# Patient Record
Sex: FEMALE | Race: WHITE | Employment: FULL TIME | URBAN - NONMETROPOLITAN AREA
[De-identification: names, ages, dates, MRNs, and addresses within clinical notes are randomized per-mention and may not be internally consistent; named-entity substitution may affect disease eponyms.]

---

## 2018-03-15 ENCOUNTER — OFFICE VISIT (OUTPATIENT)
Dept: PRIMARY CARE CLINIC | Age: 48
End: 2018-03-15
Payer: COMMERCIAL

## 2018-03-15 VITALS
HEART RATE: 84 BPM | DIASTOLIC BLOOD PRESSURE: 76 MMHG | HEIGHT: 61 IN | BODY MASS INDEX: 35.87 KG/M2 | TEMPERATURE: 98 F | WEIGHT: 190 LBS | SYSTOLIC BLOOD PRESSURE: 116 MMHG | RESPIRATION RATE: 16 BRPM

## 2018-03-15 DIAGNOSIS — T36.95XA ANTIBIOTIC-INDUCED YEAST INFECTION: Primary | ICD-10-CM

## 2018-03-15 DIAGNOSIS — B37.9 ANTIBIOTIC-INDUCED YEAST INFECTION: Primary | ICD-10-CM

## 2018-03-15 PROCEDURE — 1036F TOBACCO NON-USER: CPT | Performed by: NURSE PRACTITIONER

## 2018-03-15 PROCEDURE — G8427 DOCREV CUR MEDS BY ELIG CLIN: HCPCS | Performed by: NURSE PRACTITIONER

## 2018-03-15 PROCEDURE — G8484 FLU IMMUNIZE NO ADMIN: HCPCS | Performed by: NURSE PRACTITIONER

## 2018-03-15 PROCEDURE — 99202 OFFICE O/P NEW SF 15 MIN: CPT | Performed by: NURSE PRACTITIONER

## 2018-03-15 PROCEDURE — G8417 CALC BMI ABV UP PARAM F/U: HCPCS | Performed by: NURSE PRACTITIONER

## 2018-03-15 RX ORDER — BUPRENORPHINE HYDROCHLORIDE AND NALOXONE HYDROCHLORIDE DIHYDRATE 8; 2 MG/1; MG/1
TABLET SUBLINGUAL
Refills: 0 | COMMUNITY
Start: 2018-03-07

## 2018-03-15 RX ORDER — FLUCONAZOLE 150 MG/1
TABLET ORAL
Qty: 2 TABLET | Refills: 0 | Status: SHIPPED | OUTPATIENT
Start: 2018-03-15

## 2018-03-15 RX ORDER — TOPIRAMATE 50 MG/1
TABLET, FILM COATED ORAL
Refills: 0 | COMMUNITY
Start: 2018-03-07

## 2018-03-15 ASSESSMENT — ENCOUNTER SYMPTOMS
COUGH: 0
SORE THROAT: 0
VOMITING: 0
NAUSEA: 0

## 2018-03-15 NOTE — PATIENT INSTRUCTIONS
control pills, hormones, and steroids. You may also be more likely to get a yeast infection if you are pregnant, have diabetes, douche, or wear tight clothes. With treatment, most yeast infections get better in 2 to 3 days. Follow-up care is a key part of your treatment and safety. Be sure to make and go to all appointments, and call your doctor if you are having problems. It's also a good idea to know your test results and keep a list of the medicines you take. How can you care for yourself at home? · Take your medicines exactly as prescribed. Call your doctor if you think you are having a problem with your medicine. · Ask your doctor about over-the-counter (OTC) medicines for yeast infections. They may cost less than prescription medicines. If you use an OTC treatment, read and follow all instructions on the label. · Do not use tampons while using a vaginal cream or suppository. The tampons can absorb the medicine. Use pads instead. · Wear loose cotton clothing. Do not wear nylon or other fabric that holds body heat and moisture close to the skin. · Try sleeping without underwear. · Do not scratch. Relieve itching with a cold pack or a cool bath. · Do not wash your vaginal area more than once a day. Use plain water or a mild, unscented soap. Air-dry the vaginal area. · Change out of wet swimsuits after swimming. · Do not have sex until you have finished your treatment. · Do not douche. When should you call for help? Call your doctor now or seek immediate medical care if:  ? · You have unexpected vaginal bleeding. ? · You have new or increased pain in your vagina or pelvis. ? Watch closely for changes in your health, and be sure to contact your doctor if:  ? · You have a fever. ? · You are not getting better after 2 days. ? · Your symptoms come back after you finish your medicines. Where can you learn more? Go to https://[facility]. org and sign in to your [employer] account. Enter Z965 in the [facility] box to learn more about \"Vaginal Yeast Infection: Care Instructions. \"     If you do not have an account, please click on the \"Sign Up Now\" link. Current as of: October 13, 2016  Content Version: 11.5  © 6752-6689 [employer], Incorporated. Care instructions adapted under license by [facility] ([facility]). If you have questions about a medical condition or this instruction, always ask your healthcare professional. [address] any warranty or liability for your use of this information. · Requests medication for yeast infection, advises that she gets yeast infection every time she takes an antibiotic. · Diflucan as prescribed. · Follow up with PCP or [facility] if symptoms worsen or do not improve.

## 2020-07-20 ENCOUNTER — HOSPITAL ENCOUNTER (OUTPATIENT)
Dept: PHYSICAL THERAPY | Age: 50
Setting detail: THERAPIES SERIES
Discharge: HOME | End: 2020-07-20
Payer: COMMERCIAL

## 2020-07-20 PROCEDURE — 97161 PT EVAL LOW COMPLEX 20 MIN: CPT

## 2020-07-20 NOTE — PLAN OF CARE
[facility] LACY DIXON       Phone: [phone]   Date: 2020                      Outpatient Physical Therapy  Fax: [phone]    ACCT #: [de-identified]                     Plan of Care  [facility]#: [id number]  Patient: Rosario Torrez  : 1970    Referring Practitioner: Dr. Aric Magallanes    Referral Date : 20    Diagnosis: Strain of muscle, fascia and tendon at neck level   Onset Date: 06/10/20  Treatment Diagnosis: Neck pain    Assessment  Body structures, Functions, Activity limitations: Decreased functional mobility , Decreased ADL status, Decreased ROM, Decreased strength, Decreased high-level IADLs, Increased pain, Decreased posture  Assessment: Pt to benefit from ther ex for cervical ROM and postural strengthening. Pt to also benefit from manual therapy for joint and tissue mobility of the cervical spine. Prognosis: Good    Treatment Plan   Days: 2(2-3) times per week Weeks: 5 weeks Total # of Visits Approved: 10    Patient Education/HEP, Back Education, Therapeutic Exercise, Manual Therapy: Myofacial Release/Cupping, Manual Therapy: High Velocity Low Amplitude Thrust(HVLA), Manual Therapy: Mobilization/Manipulation, Traction, HP/CP and Electrical Stimulation     Goals  Short term goals  Time Frame for Short term goals: 5 visits  Short term goal 1: Pt to report independence and compliance with HEP for cervical ROM. Long term goals  Time Frame for Long term goals : 10 visits  Long term goal 1: Pt to score <15/50 on NDI to improve ADLs and work scout. Long term goal 2: Pt to have 60 deg of B/L cervical rotation to improve driving safety. Long term goal 3: Pt to report sleeping x2 consecutive nights without waking due to neck pain. Long term goal 4: Pt to have 4/5 Horiz ABD strength with MMT to improve pt posture and reduce neck pain.      Andreea Hendrickson, PT   Date: 2020    ______________________________________ Date: 2020  Physician Signature  By signing above or cosigning electronically, I have reviewed this Plan of Care and certify a need for medically necessary rehabilitation services.

## 2020-07-20 NOTE — PROGRESS NOTES
Phone: [facility]         Fax: [phone]                      Outpatient Physical Therapy                                                                      Evaluation    Date: 2020  Patient: Macario Payne  : 1970  ACCT #: [de-identified]    Referring Practitioner: Dr. 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 Pt reports no numbness/tingling, just pain. Pain at rest 0/10, with driving and looking R 6/10 which is the worst.  Sleep disrupted due to pain, typically sleeps with 2 pillows. Pt educated on single pillow use. Pt is left sidesleeper. Pt had xrays at [facility], reported decreased lordosis, but no degeneration. Pt takes 1/2 a flexeril at night. 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 Normal lifting max is approx 20#. Generally pain with rotation will decrease within a few min after motion is stopped. Pain Screening  Patient Currently in Pain: Denies(tightness)     Objective  Observation/Palpation  Posture: Fair(rounded shoulder with fwd head)  Palpation: Decreased mobility at C4,5,6; Paraspinal tension/knots same levels. Capitus muscle also with abnormal tone.   Spine  Cervical: Flexion=23deg, Extension=35deg, Rotation: R=25deg, L=55deg; Sidebending: R=25deg, L=15deg  Strength RUE  R Shoulder Flexion: 4+/5  R Shoulder Extension: 4+/5  R Shoulder ABduction: 4+/5  R Shoulder Horizontal ABduction: 3+/5  Strength LUE  L Shoulder Flexion: 4-/5  L Shoulder Extension: 4+/5  L Shoulder ABduction: 4-/5  L Shoulder Horizontal ABduction: 3/5    Assessment  Body structures, Functions, Activity limitations: Decreased functional mobility , Decreased ADL status, Decreased ROM, Decreased strength, Decreased high-level IADLs, Increased pain, Decreased posture  Assessment: Pt to benefit from ther ex for cervical ROM and postural strengthening. Pt to also benefit from manual therapy for joint and tissue mobility of the cervical spine. Prognosis: Good  Decision Making: Low Complexity  Exam: NDI- 18/50    Clinical Presentation:  Stable/Uncomplicated  The Following Comorbities will impact the patients progression and Plan of Care:   Previous Orthopedic Injury/Surgery       Activity Tolerance: Patient Tolerated treatment well    Education: POC; towel roll for pillow to improve night time support     Barriers to Learning: None    Goals  Short term goals  Time Frame for Short term goals: 5 visits  Short term goal 1: Pt to report independence and compliance with HEP for cervical ROM. Long term goals  Time Frame for Long term goals : 10 visits  Long term goal 1: Pt to score <15/50 on NDI to improve ADLs and work scout. Long term goal 2: Pt to have 60 deg of B/L cervical rotation to improve driving safety. Long term goal 3: Pt to report sleeping x2 consecutive nights without waking due to neck pain. Long term goal 4: Pt to have 4/5 Horiz ABD strength with MMT to improve pt posture and reduce neck pain.     Patient's Goal:  Patient goals : Be rid of her neck pain    Timed Code Treatment Minutes: 0 Minutes  Total Treatment Time: 50  Time In: 5515  Time Out: 309 Bradley Stahl PT Date: 7/20/2020

## 2020-07-23 ENCOUNTER — HOSPITAL ENCOUNTER (OUTPATIENT)
Dept: PHYSICAL THERAPY | Age: 50
Setting detail: THERAPIES SERIES
Discharge: HOME | End: 2020-07-23
Payer: COMMERCIAL

## 2020-07-23 PROCEDURE — 97140 MANUAL THERAPY 1/> REGIONS: CPT

## 2020-07-23 PROCEDURE — 97110 THERAPEUTIC EXERCISES: CPT

## 2020-07-23 ASSESSMENT — PAIN SCALES - GENERAL: PAINLEVEL_OUTOF10: 2

## 2020-07-23 NOTE — PROGRESS NOTES
Phone: [address]      Fax: [phone]                            Outpatient Physical Therapy                                                                            Daily Note    Date: 2020  Patient Name: Cheree Aase        MRN: [id number]   ACCT#:  [de-identified]  : 1970  (52 y.o.)    Referring Practitioner: Dr. Beto Dorsey    Referral Date : 20    Diagnosis: Strain of muscle, fascia and tendon at neck level   Treatment Diagnosis: Neck pain    Onset Date: 06/10/20  PT Insurance Information: [address] [place] [address]  Total # of Visits Approved: 10 Per Physician Order  Total # of Visits to Date: 2  No Show: 0  Canceled Appointment: 0    Pre-Treatment Pain:  2/10     Assessment  Assessment: Completed gym exercises per flowsheet with pt demonstrating good tolerance,no c/o increased pain. Issued doorway stretch for HEP. Ended session witn manual therapy having pt report neck feeling better. Plan to continue with POC and progress per pt tolerance. Chart Reviewed: Yes    Plan  Plan: Continue with current plan    Exercises/Modalities/Manual:  See DocFlow Sheet    Education:      Barriers to Learning: None    Goals  (Total # of Visits to Date: 2)   Short Term Goals - Time Frame for Short term goals: 5 visits  Short term goal 1: Pt to report independence and compliance with HEP for cervical ROM. Long Term Goals - Time Frame for Long term goals : 10 visits  Long term goal 1: Pt to score <15/50 on NDI to improve ADLs and work scout. Long term goal 2: Pt to have 60 deg of B/L cervical rotation to improve driving safety. Long term goal 3: Pt to report sleeping x2 consecutive nights without waking due to neck pain. Long term goal 4: Pt to have 4/5 Horiz ABD strength with MMT to improve pt posture and reduce neck pain.        Post Treatment Pain:  2/10        Time In: 0902  Time Out: 5830  Timed Code Treatment Minutes: 45 Minutes  Total Treatment Time: 39 Minutes    Time In/Out Each Unit billed  Therapeutic Exercise: 09:02-09:32  Manual [id number]:47:    Electrical Stim:  Traction:      Richie Lopez PTA     Date: 7/23/2020

## 2020-07-27 ENCOUNTER — HOSPITAL ENCOUNTER (OUTPATIENT)
Dept: PHYSICAL THERAPY | Age: 50
Setting detail: THERAPIES SERIES
Discharge: HOME | End: 2020-07-27
Payer: COMMERCIAL

## 2020-07-27 PROCEDURE — 97110 THERAPEUTIC EXERCISES: CPT

## 2020-07-27 PROCEDURE — 97140 MANUAL THERAPY 1/> REGIONS: CPT

## 2020-07-27 NOTE — PROGRESS NOTES
Phone: [address]      Fax: [phone]                            Outpatient Physical Therapy                                                                            Daily Note    Date: 2020  Patient Name: Irina Walker        MRN: [id number]   ACCT#:  [de-identified]  : 1970  (52 y.o.)    Referring Practitioner: Dr. Maurice Brochure    Referral Date : 20    Diagnosis: Strain of muscle, fascia and tendon at neck level   Treatment Diagnosis: Neck pain    Onset Date: 06/10/20  PT Insurance Information: [address] [place] [address]  Total # of Visits Approved: 10 Per Physician Order  Total # of Visits to Date: 3  No Show: 0  Canceled Appointment: 0    Pre-Treatment Pain:  0/10     Assessment  Pt reports decreased pain after last visit. No pain today. Performed ex as outlined for postural strengthening and flexibility followed by manual therapy for mobility. Chart Reviewed: Yes    Plan  Plan: Continue with current plan    Exercises/Modalities/Manual:  See DocFlow Sheet     Barriers to Learning: None    Goals  (Total # of Visits to Date: 3)   Short Term Goals - Time Frame for Short term goals: 5 visits  Short term goal 1: Pt to report independence and compliance with HEP for cervical ROM. -met        Long Term Goals - Time Frame for Long term goals : 10 visits  Long term goal 1: Pt to score <15/50 on NDI to improve ADLs and work scout. Long term goal 2: Pt to have 60 deg of B/L cervical rotation to improve driving safety. Long term goal 3: Pt to report sleeping x2 consecutive nights without waking due to neck pain. Long term goal 4: Pt to have 4/5 Horiz ABD strength with MMT to improve pt posture and reduce neck pain.        Post Treatment Pain:  0/10    Time In: 1415    Time Out : 1500        Timed Code Treatment Minutes: 45 Minutes  Total Treatment Time: 45 Minutes    Time In/Out Each Unit billed  Therapeutic Exercise:  30 min  Manual Therapy:  15 min        Shari Quinones PTA

## 2020-08-03 ENCOUNTER — HOSPITAL ENCOUNTER (OUTPATIENT)
Dept: PHYSICAL THERAPY | Age: 50
Setting detail: THERAPIES SERIES
Discharge: HOME | End: 2020-08-03
Payer: COMMERCIAL

## 2020-08-03 PROCEDURE — 97110 THERAPEUTIC EXERCISES: CPT

## 2020-08-03 PROCEDURE — 97140 MANUAL THERAPY 1/> REGIONS: CPT

## 2020-08-03 ASSESSMENT — PAIN SCALES - GENERAL: PAINLEVEL_OUTOF10: 2

## 2020-08-03 NOTE — PROGRESS NOTES
Phone: [address]      Fax: [phone]                            Outpatient Physical Therapy                                                                            Daily Note    Date: 8/3/2020  Patient Name: Jessica Mendoza        MRN: [id number]   ACCT#:  [de-identified]  : 1970  (52 y.o.)    Referring Practitioner: Dr. Venkatesh Dunn    Referral Date : 20    Diagnosis: Strain of muscle, fascia and tendon at neck level   Treatment Diagnosis: Neck pain    Onset Date: 06/10/20  PT Insurance Information: [facility] 10 exp 20  Total # of Visits Approved: 10 Per Physician Order  Total # of Visits to Date: 4  No Show: 0  Canceled Appointment: 0  Plan of Care/Certification Expiration Date: 20    Pre-Treatment Pain:  2/10     Assessment  Assessment: Pt reports increased pain post last session and still sore this date. Pt reports that she has more of an ache/soreness vs sharp pain. No progression of ther ex this date but will plan to add wt to prone ther ex next visit. Chart Reviewed: Yes    Plan  Plan: Continue with current plan    Exercises/Modalities/Manual:  See DocFlow Sheet    Education: held progression of therex this date due to increased pain post last session     Barriers to Learning: None    Goals  (Total # of Visits to Date: 4)   Short Term Goals - Time Frame for Short term goals: 5 visits  Short term goal 1: Pt to report independence and compliance with HEP for cervical ROM. -met       Long Term Goals - Time Frame for Long term goals : 10 visits(POC EXP 20)  Long term goal 1: Pt to score <15/50 on NDI to improve ADLs and work scout. Long term goal 2: Pt to have 60 deg of B/L cervical rotation to improve driving safety. Long term goal 3: Pt to report sleeping x2 consecutive nights without waking due to neck pain. Long term goal 4: Pt to have 4/5 Horiz ABD strength with MMT to improve pt posture and reduce neck pain.        Post Treatment Pain: 0/10    Time In: 1502  Time Out: 1551  Timed Code Treatment Minutes: 49 Minutes  Total Treatment Time: 49 Minutes    Time In/Out Each Unit billed  Therapeutic Exercise:  0419-9696  Manual Therapy:  5765-8293  Electrical Stim:  Traction:  Seward Mcburney, PT     Date: 8/3/2020

## 2020-08-10 ENCOUNTER — HOSPITAL ENCOUNTER (OUTPATIENT)
Dept: PHYSICAL THERAPY | Age: 50
Setting detail: THERAPIES SERIES
Discharge: HOME | End: 2020-08-10
Payer: COMMERCIAL

## 2020-08-10 PROCEDURE — 97110 THERAPEUTIC EXERCISES: CPT

## 2020-08-10 PROCEDURE — 97140 MANUAL THERAPY 1/> REGIONS: CPT

## 2020-08-10 NOTE — PROGRESS NOTES
Phone: [address]      Fax: [phone]                            Outpatient Physical Therapy                                                                            Daily Note    Date: 8/10/2020  Patient Name: Cheree Aase        MRN: [id number]   ACCT#:  [de-identified]  : 1970  (52 y.o.)    Referring Practitioner: Dr. Beto Dorsey    Referral Date : 20    Diagnosis: Strain of muscle, fascia and tendon at neck level   Treatment Diagnosis: Neck pain    Onset Date: 06/10/20  PT Insurance Information: [facility] 10v exp 20  Total # of Visits Approved: 10 Per Physician Order  Total # of Visits to Date: 5  No Show: 0  Canceled Appointment: 0  Plan of Care/Certification Expiration Date: 20    Pre-Treatment Pain:  0/10     Assessment  Assessment: Patient late for appointment this date. Patient arrived and left painfree this date. Completed exercises per exercise log for strengthening and manual for improved ROM. Cervical ROM post manual therapy R = 45 degrees L 65 degrees. Plan to continue with current POC. Chart Reviewed: Yes    Plan  Plan: Continue with current plan    Exercises/Modalities/Manual:  See DocFlow Sheet    Education:      Barriers to Learning: None    Goals  (Total # of Visits to Date: 5)   Short Term Goals - Time Frame for Short term goals: 5 visits  Short term goal 1: Pt to report independence and compliance with HEP for cervical ROM. -met                Long Term Goals - Time Frame for Long term goals : 10 visits(POC EXP 20)  Long term goal 1: Pt to score <15/50 on NDI to improve ADLs and work scout. Long term goal 2: Pt to have 60 deg of B/L cervical rotation to improve driving safety. Long term goal 3: Pt to report sleeping x2 consecutive nights without waking due to neck pain. Long term goal 4: Pt to have 4/5 Horiz ABD strength with MMT to improve pt posture and reduce neck pain.        Post Treatment Pain:  0/10        Time In: 1437  Time Out: 1512  Timed Code Treatment Minutes: 35 Minutes  Total Treatment Time: 35 Minutes  [id number]   Manual 1521 - 100 [facility] , PTA     Date: 8/10/2020

## 2020-08-13 ENCOUNTER — HOSPITAL ENCOUNTER (OUTPATIENT)
Dept: PHYSICAL THERAPY | Age: 50
Setting detail: THERAPIES SERIES
Discharge: HOME | End: 2020-08-13
Payer: COMMERCIAL

## 2020-08-13 PROCEDURE — 97140 MANUAL THERAPY 1/> REGIONS: CPT

## 2020-08-13 PROCEDURE — 97110 THERAPEUTIC EXERCISES: CPT

## 2020-08-13 ASSESSMENT — PAIN SCALES - GENERAL: PAINLEVEL_OUTOF10: 1

## 2020-08-13 NOTE — PROGRESS NOTES
Phone: [address] Reyes      Fax: [phone]                            Outpatient Physical Therapy                                                                            Daily Note    Date: 2020  Patient Name: Macario Payne        MRN: [id number]   ACCT#:  [de-identified]  : 1970  (52 y.o.)    Referring Practitioner: Dr. Nevaeh Urias    Referral Date : 20    Diagnosis: Strain of muscle, fascia and tendon at neck level   Treatment Diagnosis: Neck pain    Onset Date: 06/10/20  PT Insurance Information: [facility] 10v exp 20  Total # of Visits Approved: 10 Per Physician Order  Total # of Visits to Date: 6  No Show: 0  Canceled Appointment: 0  Plan of Care/Certification Expiration Date: 20    Pre-Treatment Pain:  1/10     Assessment  Assessment: Patient reports sleeping has improved and occassionally has a night where she is awakend by pain. Progressed Tband exercises this date and rows on ball. Pt continues to have increased pain with over ball horiz ABD, kept reps the same. Plan to continue with curent at this time. Chart Reviewed: Yes    Plan  Plan: Continue with current plan    Exercises/Modalities/Manual:  See DocFlow Sheet    Education:      Barriers to Learning: None    Goals  (Total # of Visits to Date: 6)   Short Term Goals - Time Frame for Short term goals: 5 visits  Short term goal 1: Pt to report independence and compliance with HEP for cervical ROM. -met                Long Term Goals - Time Frame for Long term goals : 10 visits(POC EXP 20)  Long term goal 1: Pt to score <15/50 on NDI to improve ADLs and work scout. Long term goal 2: Pt to have 60 deg of B/L cervical rotation to improve driving safety. Long term goal 3: Pt to report sleeping x2 consecutive nights without waking due to neck pain. Long term goal 4: Pt to have 4/5 Horiz ABD strength with MMT to improve pt posture and reduce neck pain.        Post Treatment Pain:  1/10      Time In: 1433  Time Out: 1511  Timed Code Treatment Minutes: 38 Minutes  Total Treatment Time: 38 Minutes    Time In/Out Each Unit billed  Therapeutic Exercise:  [id number]  Manual Therapy: [id number]   Electrical Stim:  Traction:      Bailey Parker PTA     Date: 8/13/2020

## 2020-08-18 ENCOUNTER — HOSPITAL ENCOUNTER (OUTPATIENT)
Dept: PHYSICAL THERAPY | Age: 50
Setting detail: THERAPIES SERIES
Discharge: HOME | End: 2020-08-18
Payer: COMMERCIAL

## 2020-08-18 PROCEDURE — 97110 THERAPEUTIC EXERCISES: CPT

## 2020-08-18 PROCEDURE — 97012 MECHANICAL TRACTION THERAPY: CPT

## 2020-08-18 ASSESSMENT — PAIN SCALES - GENERAL: PAINLEVEL_OUTOF10: 1

## 2020-08-18 NOTE — PROGRESS NOTES
Phone: [address] Re[address]      Fax: [phone]                            Outpatient Physical Therapy                                                                            Daily Note    Date: 2020  Patient Name: Juli Watson        MRN: [id number]   ACCT#:  [de-identified]  : 1970  (52 y.o.)    Referring Practitioner: Dr. Ziggy Hansen    Referral Date : 20    Diagnosis: Strain of muscle, fascia and tendon at neck level   Treatment Diagnosis: Neck pain    Onset Date: 06/10/20  PT Insurance Information: [facility] 10v exp 20  Total # of Visits Approved: 10 Per Physician Order  Total # of Visits to Date: 7  Plan of Care/Certification Expiration Date: 20    Pre-Treatment Pain:  1/10     Assessment  Assessment: Patient arrived stating having difficulties with shoulder flexion. Patient reports sympoms across clavical and down arm have returned and increased when pulling truck door shut. PT consulted and it was decided to discontinue with manual and attempt traction to decrease symptoms. Patient left without any increased pain. Chart Reviewed: Yes    Plan  Plan: Continue with current plan    Exercises/Modalities/Manual:  See DocFlow Sheet    Education:      Barriers to Learning: None    Goals  (Total # of Visits to Date: 7)   Short Term Goals - Time Frame for Short term goals: 5 visits  Short term goal 1: Pt to report independence and compliance with HEP for cervical ROM. -met                Long Term Goals - Time Frame for Long term goals : 10 visits(POC EXP 20)  Long term goal 1: Pt to score <15/50 on NDI to improve ADLs and work scout. Long term goal 2: Pt to have 60 deg of B/L cervical rotation to improve driving safety. Long term goal 3: Pt to report sleeping x2 consecutive nights without waking due to neck pain. Long term goal 4: Pt to have 4/5 Horiz ABD strength with MMT to improve pt posture and reduce neck pain.        Post Treatment Pain: 1/10        Time In: 1454  Time Out: 1530  Timed Code Treatment Minutes: 26 Minutes  Total Treatment Time: 38 Minutes    Time In/Out Each Unit billed  Therapeutic Exercise:  1454 - 1525  Manual Therapy:    Electrical Stim:  Traction:1520 - 0      Storm Pereira, PTA     Date: 8/18/2020

## 2020-08-20 ENCOUNTER — HOSPITAL ENCOUNTER (OUTPATIENT)
Dept: PHYSICAL THERAPY | Age: 50
Setting detail: THERAPIES SERIES
Discharge: HOME | End: 2020-08-20
Payer: COMMERCIAL

## 2020-08-20 PROCEDURE — 97012 MECHANICAL TRACTION THERAPY: CPT

## 2020-08-20 PROCEDURE — 97110 THERAPEUTIC EXERCISES: CPT

## 2020-08-20 ASSESSMENT — PAIN SCALES - GENERAL: PAINLEVEL_OUTOF10: 2

## 2020-08-20 NOTE — PROGRESS NOTES
Phone: [address]            Fax: [phone]                             Outpatient Physical Therapy                                                                      Progress Report    Date: 2020   MRN: [id number]    ACCT#: [de-identified]  Patient: Jean Pierre Mario  : 1970  Referring Practitioner: Dr. Kandra Blizzard    Referral Date : 20    Diagnosis: Strain of muscle, fascia and tendon at neck level       Treatment Diagnosis: Neck pain    Onset Date: 06/10/20  PT Insurance Information: [address] [place] [address] 10 exp 20  Total # of Visits Approved: 10 Per Physician Order  Total # of Visits to Date: 8  No Show: 0  Canceled Appointment: 0    Assessment  Pt reports no change with traction noted. Sleeping is not a problem, pt not waking due to pain. Pt reports yesterday when opening a jar pain increased. Pt reports that with any R UE use requiring force the pain increases. Held wts with Sandi Copas this date due to increased pain with weight. Increased traction pull to 14# this date with good scout. Cervical Rotation: R=43deg, L=70deg. Pt reports pain still reaches 8/10 when it occurs. Pt reports pain starts at C5-6 R side of neck and radiates into SCM distribution. Pt likely to benefit from additional imaging to determine source of pain    Prognosis: Good    Plan  Plan: Continue with current plan    Goals  Short term goals  Time Frame for Short term goals: 5 visits  Short term goal 1: Pt to report independence and compliance with HEP for cervical ROM. -met    Long term goals  Time Frame for Long term goals : 10 visits(POC EXP 20)  Long term goal 1: Pt to score <15/50 on NDI to improve ADLs and work scout. Long term goal 2: Pt to have 60 deg of B/L cervical rotation to improve driving safety. Long term goal 3: Pt to report sleeping x2 consecutive nights without waking due to neck pain. -MET  Long term goal 4: Pt to have 4/5 Horiz ABD strength with MMT to improve pt posture and reduce neck pain.     Tiffanie Parra    Date: 8/20/2020

## 2020-08-20 NOTE — PROGRESS NOTES
Phone: Jayce Reyes      Fax: [phone]                            Outpatient Physical Therapy                                                                            Daily Note    Date: 2020  Patient Name: Eunice Sierra        MRN: [id number]   ACCT#:  [de-identified]  : 1970  (52 y.o.)    Referring Practitioner: Dr. Donta Blood    Referral Date : 20    Diagnosis: Strain of muscle, fascia and tendon at neck level   Treatment Diagnosis: Neck pain    Onset Date: 06/10/20  PT Insurance Information: [id number] 10 exp 20  Total # of Visits Approved: 10 Per Physician Order  Total # of Visits to Date: 8  No Show: 0  Canceled Appointment: 0  Plan of Care/Certification Expiration Date: 20    Pre-Treatment Pain:  2/10     Assessment  Assessment: Pt reports no change with traction noted. Sleeping is not a problem, pt not waking due to pain. Pt reports yesterday when opening a jar pain increased. Pt reports that with any R UE use requiring force the pain increases. Held wts with Román Arik this date due to increased pain with weight. Cervical Rotation: R=43deg, L=70deg. Increased traction pull to 14# this date with good scout. Pt reports pain still reaches 8/10 when it occurs. Pt reports pain starts at C5-6 R side of neck and radiates into SCM distribution. Chart Reviewed: Yes    Plan  Plan: Continue with current plan    Exercises/Modalities/Manual:  See DocFlow Sheet    Education: Discussed calling physician due to symptoms returning with R UE use. Pt likely to benefit from additional imaging     Barriers to Learning: None    Goals  (Total # of Visits to Date: 8)   Short Term Goals - Time Frame for Short term goals: 5 visits  Short term goal 1: Pt to report independence and compliance with HEP for cervical ROM. -met     Long Term Goals - Time Frame for Long term goals : 10 visits(POC EXP 20)  Long term goal 1: Pt to score <15/50 on NDI to improve ADLs and work scout. Long term goal 2: Pt to have 60 deg of B/L cervical rotation to improve driving safety.-Not Met  Long term goal 3: Pt to report sleeping x2 consecutive nights without waking due to neck pain. -MET  Long term goal 4: Pt to have 4/5 Horiz ABD strength with MMT to improve pt posture and reduce neck pain.        Post Treatment Pain:  2/10    Time In: 1301   Time Out: 1339  Timed Code Treatment Minutes: 26 Minutes  Total Treatment Time: JUANITA Still     Date: 8/20/2020

## 2020-08-28 ENCOUNTER — APPOINTMENT (OUTPATIENT)
Dept: PHYSICAL THERAPY | Age: 50
End: 2020-08-28
Payer: COMMERCIAL

## 2021-08-12 ENCOUNTER — HOSPITAL ENCOUNTER (OUTPATIENT)
Dept: PHYSICAL THERAPY | Age: 51
Setting detail: THERAPIES SERIES
Discharge: HOME | End: 2021-08-12
Payer: COMMERCIAL

## 2021-08-12 PROCEDURE — 97110 THERAPEUTIC EXERCISES: CPT

## 2021-08-12 PROCEDURE — 97162 PT EVAL MOD COMPLEX 30 MIN: CPT

## 2021-08-12 ASSESSMENT — PAIN SCALES - GENERAL: PAINLEVEL_OUTOF10: 5

## 2021-08-12 ASSESSMENT — PAIN DESCRIPTION - ORIENTATION: ORIENTATION: RIGHT

## 2021-08-12 ASSESSMENT — PAIN DESCRIPTION - LOCATION: LOCATION: SHOULDER

## 2021-08-12 NOTE — PROGRESS NOTES
Phone: [phone]         Fax: [phone]                      Outpatient Physical Therapy                                                                      Evaluation    Date: 2021  Patient: Genia Morrison  : 1970  ACCT #: [de-identified]    Referring Practitioner: Donnie Cavazos PA-C    Referral Date : 21    Diagnosis: Impingement syndrome right shoulder, OA, Bicep tendonitis R UE    Treatment Diagnosis: R shld pain  Onset Date: 21 (Referral)  Total # of Visits Approved: 18 Per Physician Order  Total # of Visits to Date: 1     Subjective     Additional Pertinent Hx: Patient c/o gradual onset right shld pain about 8 months. Pain in R shld blade and shld 5/10. Works as Manager at [employer]. Work increases pain. C/o stiffens, unable to reach bra behind back. Is Right handed. Xray and MRI- impingement, OA, bicep tendonitis. Hx-neck pain, L lknee ligament repair, migrain headaches controled with meds. Two cortisone injections in R shld. Has gastritis so can't take NSAIDs. Patient had 2 months of physical therapy in [address] [place] [address] earlier this year, but couldn't attend regularly due to her work schedule.   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       Objective        Joint Mobility  ROM RUE: capsular tightness  Strength RUE  Strength RUE: Exception  R Shoulder Flexion: 4/5  R Shoulder ABduction: 3+/5  R Shoulder External Rotation: 3+/5  R Shoulder Horizontal ABduction: 4/5  R Elbow Flexion: 5/5  R Elbow Extension: 5/5  Strength LUE  Strength LUE: WFL    AROM RUE (degrees)  RUE AROM : Exceptions  R Shoulder Flexion 0-180: 160  R Shoulder ABduction 0-180: 90     PROM RUE (degrees)  RUE PROM: Exceptions  R Shoulder Flex  0-180: 160  R Shoulder ABduction 0-180: 120  R Shoulder Int Rotation  0-70: 55  R Shoulder Ext Rotation  0-90: 85                                     Assessment  Body structures, Functions, Activity limitations: Decreased ADL status, Decreased ROM, Decreased strength, Increased pain  Assessment: Patient having stiff and painful R shld, follows capsular pattern restriction. Completed manual therapy and therex per Doc Flow. Educated patient on and issued HEP handout. Prognosis: Good  Decision Making: Medium Complexity  History: as above  Exam: UEFS score 19/80    Clinical Presentation:  Evolving  The Following Comorbities will impact the patients progression and Plan of Care:   Previous Orthopedic Injury/Surgery            Education: On POC and HEP an using CP/ice          Goals  Short term goals  Time Frame for Short term goals: 9  Short term goal 1: Patient to be independent with HEP  Short term goal 2: Increase PROM R shld IR 70 degress  Short term goal 3: Increase PROM R shld abd 135 degrees    Long term goals  Time Frame for Long term goals : 25  Long term goal 1: Patient to have decrease pain 2/10 at worst R shld x 3 days  Long term goal 2:  Increase strength R shld flexion 5/5 to lift overhead  Long term goal 3: Improve functional activities with UEFS score >60/80    Patient's Goal:   Regain full use of right arm without pain interfering with activities    Timed Code Treatment Minutes: 20 Minutes  Total Treatment Time: 50     Time In: 13:45  Time Out: 14:35    Charlene Levy PT Date: 8/12/2021

## 2021-08-16 ENCOUNTER — HOSPITAL ENCOUNTER (OUTPATIENT)
Dept: PHYSICAL THERAPY | Age: 51
Setting detail: THERAPIES SERIES
Discharge: HOME | End: 2021-08-16
Payer: COMMERCIAL

## 2021-08-16 PROCEDURE — 97140 MANUAL THERAPY 1/> REGIONS: CPT

## 2021-08-16 PROCEDURE — 97110 THERAPEUTIC EXERCISES: CPT

## 2021-08-16 ASSESSMENT — PAIN SCALES - GENERAL: PAINLEVEL_OUTOF10: 4

## 2021-08-16 NOTE — PROGRESS NOTES
Phone: Jayce Reyes      Fax: [phone]                            Outpatient Physical Therapy                                                                            Daily Note    Date: 2021  Patient Name: Candida Louis        MRN: [id number]   ACCT#:  [de-identified]  : 1970  (54 y.o.)    Referring Practitioner: Kourtney Leal PA-C    Referral Date : 21    Diagnosis: Impingement syndrome right shoulder, OA, Bicep tendonitis R UE  Treatment Diagnosis: R shld pain    Onset Date: 21 (Referral)  Total # of Visits Approved: 18 Per Physician Order  Total # of Visits to Date: 2  Plan of Care/Certification Expiration Date: 21    Pre-Treatment Pain:  4/10     Assessment  Assessment: Patient arrives stating pain 4/10. Pt completed exercises and stretches per log above. Pt reports completing HEP with good understanding over weekend. Plan to continue with current POC and progress per pt tolerance. Chart Reviewed: Yes    Plan  Plan: Continue with current plan    Exercises/Modalities/Manual:  See DocFlow Sheet    Education:           Goals  (Total # of Visits to Date: 2)   Short Term Goals - Time Frame for Short term goals: 9  Short term goal 1: Patient to be independent with HEP  Short term goal 2: Increase PROM R shld IR 70 degress  Short term goal 3: Increase PROM R shld abd 135 degrees          Long Term Goals - Time Frame for Long term goals : 25  Long term goal 1: Patient to have decrease pain 2/10 at worst R shld x 3 days  Long term goal 2:  Increase strength R shld flexion 5/5 to lift overhead  Long term goal 3: Improve functional activities with UEFS score >60/80          Post Treatment Pain:  4/10      Time In: 1500  Time Out: 1546  Timed Code Treatment Minutes: 46 Minutes  Total Treatment Time: 55 Minutes    Saurabh Barr PTA     Date: 2021

## 2021-08-18 ENCOUNTER — HOSPITAL ENCOUNTER (OUTPATIENT)
Dept: PHYSICAL THERAPY | Age: 51
Setting detail: THERAPIES SERIES
Discharge: HOME | End: 2021-08-18
Payer: COMMERCIAL

## 2021-08-18 PROCEDURE — 97140 MANUAL THERAPY 1/> REGIONS: CPT

## 2021-08-18 PROCEDURE — 97110 THERAPEUTIC EXERCISES: CPT

## 2021-08-18 ASSESSMENT — PAIN SCALES - GENERAL: PAINLEVEL_OUTOF10: 4

## 2021-08-18 NOTE — PROGRESS NOTES
Phone: Jayce Reyes      Fax: [phone]                            Outpatient Physical Therapy                                                                            Daily Note    Date: 2021  Patient Name: Mary Fuller        MRN: [id number]   ACCT#:  [de-identified]  : 1970  (54 y.o.)    Referring Practitioner: Varsha Flood PA-C    Referral Date : 21    Diagnosis: Impingement syndrome right shoulder, OA, Bicep tendonitis R UE  Treatment Diagnosis: R shld pain    Onset Date: 21 (Referral)  Total # of Visits Approved: 18 Per Physician Order  Total # of Visits to Date: 3  Plan of Care/Certification Expiration Date: 21    Pre-Treatment Pain:  4/10     Assessment  Assessment: Patient arrives reporting pain is 4/10. Pt completed exercises per log and ending with manual therapy. PROM ABD = 118 degrees PROM IR = 68 degrees Plan to continue with current POC. Chart Reviewed: Yes    Plan  Plan: Continue with current plan    Exercises/Modalities/Manual:  See DocFlow Sheet    Education:           Goals  (Total # of Visits to Date: 3)   Short Term Goals - Time Frame for Short term goals: 9  Short term goal 1: Patient to be independent with HEP Met  Short term goal 2: Increase PROM R shld IR 70 degress  Short term goal 3: Increase PROM R shld abd 135 degrees          Long Term Goals - Time Frame for Long term goals : 25  Long term goal 1: Patient to have decrease pain 2/10 at worst R shld x 3 days  Long term goal 2:  Increase strength R shld flexion 5/5 to lift overhead  Long term goal 3: Improve functional activities with UEFS score >60/80          Post Treatment Pain:  4/10      Time In: 1423  Time Out: 1503  Timed Code Treatment Minutes: 40 Minutes  Total Treatment Time: 36 Minutes    Terrell Maldonado PTA     Date: 2021

## 2021-08-23 ENCOUNTER — HOSPITAL ENCOUNTER (OUTPATIENT)
Dept: PHYSICAL THERAPY | Age: 51
Setting detail: THERAPIES SERIES
Discharge: HOME | End: 2021-08-23
Payer: COMMERCIAL

## 2021-08-23 PROCEDURE — 97110 THERAPEUTIC EXERCISES: CPT

## 2021-08-23 PROCEDURE — 97140 MANUAL THERAPY 1/> REGIONS: CPT

## 2021-08-23 ASSESSMENT — PAIN SCALES - GENERAL: PAINLEVEL_OUTOF10: 4

## 2021-08-23 NOTE — PROGRESS NOTES
Phone: Jayce Reyes      Fax: [phone]                            Outpatient Physical Therapy                                                                            Daily Note    Date: 2021  Patient Name: Charlene Ferrer        MRN: [id number]   ACCT#:  [de-identified]  : 1970  (54 y.o.)    Referring Practitioner: Carol Carbajal PA-C    Referral Date : 21    Diagnosis: Impingement syndrome right shoulder, OA, Bicep tendonitis R UE  Treatment Diagnosis: R shld pain    Onset Date: 21 (Referral)  Total # of Visits Approved: 18 Per Physician Order  Total # of Visits to Date: 4  Plan of Care/Certification Expiration Date: 21    Pre-Treatment Pain:  4/10     Assessment  Assessment: Patient continues to rate pain 4/10. Pt completed exercises per log demonstrating good tolerance. session ended with manual therapy to increase with ROM. PROM ABD post manual = 128 degrees. Pt to see physician . Plan to send progress note to physician next visit. Chart Reviewed: Yes    Plan  Plan: Continue with current plan    Exercises/Modalities/Manual:  See DocFlow Sheet    Education:           Goals  (Total # of Visits to Date: 4)   Short Term Goals - Time Frame for Short term goals: 9  Short term goal 1: Patient to be independent with HEP Met  Short term goal 2: Increase PROM R shld IR 70 degress  Short term goal 3: Increase PROM R shld abd 135 degrees          Long Term Goals - Time Frame for Long term goals : 25  Long term goal 1: Patient to have decrease pain 2/10 at worst R shld x 3 days  Long term goal 2:  Increase strength R shld flexion 5/5 to lift overhead  Long term goal 3: Improve functional activities with UEFS score >60/80          Post Treatment Pain:  5/10      Time In: 1421  Time Out: 1500  Timed Code Treatment Minutes: 39 Minutes  Total Treatment Time: 44 Minutes    Kami Sosa PTA     Date: 2021

## 2021-08-26 ENCOUNTER — APPOINTMENT (OUTPATIENT)
Dept: PHYSICAL THERAPY | Age: 51
End: 2021-08-26
Payer: COMMERCIAL

## 2021-08-30 NOTE — PROGRESS NOTES
[facility]    Date: 2021  Patient Name: Sivan Rhodes        : 1970       Pt Cancelled Appt due to putting therapy on hold, per physician until after surgery.       Cleda Elliot Date: 2021

## 2021-08-31 ENCOUNTER — HOSPITAL ENCOUNTER (OUTPATIENT)
Dept: PHYSICAL THERAPY | Age: 51
Setting detail: THERAPIES SERIES
Discharge: HOME | End: 2021-08-31
Payer: COMMERCIAL

## 2021-09-01 NOTE — DISCHARGE SUMMARY
Phone: Jayce Reyes             Fax: [phone]                            Outpatient Physical Therapy                                                                    Discharge Summary    Patient: Yaquelin Zavala  : 1970  ACCT #: [de-identified]   Referring Practitioner: Rebeca Sears PA-C      Diagnosis: Impingement syndrome right shoulder, OA, Bicep tendonitis R UE    Date Treatment Initiated: 21  Date of Last Treatment: 21    PT Visit Information  Onset Date: 21 (Referral)  Total # of Visits Approved: 18  Total # of Visits to Date: 4  Plan of Care/Certification Expiration Date: 21    Frequency/Duration  Days: 3 times per week  Weeks: 6 weeks    Treatment Received  Patient Education/HEP, Therapeutic Exercise, Manual Therapy: Myofacial Release/Cupping and Manual Therapy: Mobilization/Manipulation    Pain Level: 4    Assessment  Assessment: Patient continues to rate pain 4/10. PROM R shoulder ABD = 128 degrees, PROM IR = 68 degrees           Reason for Discharge  Patient Self Discharge- scheduled for surgery    Comments:   Thank you for this referral      Aj Dutton, PT  Date: 2021

## 2021-09-22 ENCOUNTER — HOSPITAL ENCOUNTER (OUTPATIENT)
Dept: PHYSICAL THERAPY | Age: 51
Setting detail: THERAPIES SERIES
Discharge: HOME | End: 2021-09-22
Payer: COMMERCIAL

## 2021-09-22 NOTE — PROGRESS NOTES
[facility]    Date: 2021  Patient Name: Alex Martínez        : 1970       Patient left voice mail saying Daniel Deandre canceled surgery due to high Covid numbers and she will call back to R/S after she gets her surgery.        Casandra Mckeon Date: 2021

## 2021-10-06 ENCOUNTER — HOSPITAL ENCOUNTER (OUTPATIENT)
Dept: PHYSICAL THERAPY | Age: 51
Setting detail: THERAPIES SERIES
Discharge: HOME | End: 2021-10-06
Payer: COMMERCIAL

## 2021-10-06 PROCEDURE — 97162 PT EVAL MOD COMPLEX 30 MIN: CPT

## 2021-10-06 ASSESSMENT — PAIN SCALES - GENERAL: PAINLEVEL_OUTOF10: 5

## 2021-10-06 NOTE — PLAN OF CARE
[place] [facility]       Phone: [phone]   Date: 10/6/2021                      Outpatient Physical Therapy  Fax: [phone]    ACCT #: [de-identified]                     Plan of Care  [facility]#: [id number]  Patient: Allyn Thurston  : 1970    Referring Practitioner: Dr. Shannan Taylor    Referral Date : 10/04/21    Diagnosis: Right shoulder debridement  Onset Date: 10/04/21  Treatment Diagnosis: Right shoulder surgery    Assessment: Patient underwent right shoulder debridement and presents with limited active/passive ROM and strength which limits her ability to complete basic self-care or household tasks  Prognosis: Good    Treatment Plan :  Days: 2 (2-3x as status permits) times per week Weeks: 8 weeks Total # of Visits Approved: 24    Patient Education/HEP, Therapeutic Exercise and Manual Therapy     Goals:   Time Frame for Short term goals: 10 visits  Short term goal 1: Educate on home program of shoulder/scapular ROM as well as distal extremity ROM/strengthening  Short term goal 2: PROM shoulder flex 100 deg  Short term goal 3: PROM shoulder abduction to 100 deg  Short term goal 4: PROM ER 50 deg    Time Frame for Long term goals : 20 visits  Long term goal 1: Upgrade home program for shoulder and scapular strengthening  Long term goal 2: PROM flexion and abduction > 150 deg  Long term goal 3: PROM ER > 65 deg @ 90 deg abd  Long term goal 4: Functional active mobility to reach back /base of head for hair care and IR to touch mid back/don coat  Long term goal 5: Strength right UE to carry gallon of mild at waist height     JUAN CORTÉS, PT   Date: 10/6/2021    ______________________________________ Date: 10/6/2021  Physician Signature  By signing above or cosigning electronically, I have reviewed this Plan of Care and certify a need for medically necessary rehabilitation services.

## 2021-10-06 NOTE — PROGRESS NOTES
Phone: [phone] [facility]         Fax: [phone]                      Outpatient Physical Therapy                                                                      Evaluation    Date: 10/6/2021  Patient: More Mckeon  : 1970  ACCT #: [de-identified]    Referring Practitioner: Dr. Augie Shaffer    Referral Date : 10/04/21    Diagnosis: Right shoulder debridement    Treatment Diagnosis: Right shoulder surgery  Onset Date: 10/04/21  Total # of Visits Approved: 24 Per Physician Order  Total # of Visits to Date: 1  No Show: 0  Canceled Appointment: 0     Subjective     Additional Pertinent Hx: Patient with progressive right shoulder pain for approx 1 year; no etiology. Attempted previous PT without benefit. Underwent shoulder arthroscopy on 10/4/21 for debridement however no RC tear or bone spurs noted. Currently in sling with pain pump. Limited activity thus far. Patent limited with self-care tasks as well as household tasks. UEFS = 0/80. PMHx includes left knee surgery. 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 No signs of redness or irritation  Edema:  Moderate edema anterior shoulder region     Strength RUE  Comment: Not assess at shoulder due to recent surgery and limited mobility;  elbow 3/5     AROM RUE (degrees)  RUE General AROM: Not assessed shoulder mobility; elbow flex/ext full with discomfort     PROM RUE (degrees)  R Shoulder Flex  0-180: 80 deg  R Shoulder ABduction 0-180: 90 deg  R Shoulder Int Rotation  0-70: 50 deg @ 45 deg abd  R Shoulder Ext Rotation  0-90: 30 deg @ 45 deg abd     PROM RLE (degrees)  RLE PROM: Exceptions                               Assessment  Assessment: Patient underwent right shoulder debridement and presents with limited active/passive ROM and strength which limits her ability to complete basic self-care or household tasks  Prognosis: Good  Decision Making: Medium Complexity  Exam: UEFS = 0/80    Clinical Presentation:  Evolving  The Following Comorbities will impact the patients progression and Plan of Care:   Previous Orthopedic Injury/Surgery       Activity Tolerance: Patient Tolerated treatment well    Education: PT POC/goals;   Issued written handout of AAROM table top and seated isometrics          Goals  Short term goals  Time Frame for Short term goals: 10 visits  Short term goal 1: Educate on home program of shoulder/scapular ROM as well as distal extremity ROM/strengthening  Short term goal 2: PROM shoulder flex 100 deg  Short term goal 3: PROM shoulder abduction to 100 deg  Short term goal 4: PROM ER 50 deg    Long term goals  Time Frame for Long term goals : 20 visits  Long term goal 1: Upgrade home program for shoulder and scapular strengthening  Long term goal 2: PROM flexion and abduction > 150 deg  Long term goal 3: PROM ER > 65 deg @ 90 deg abd  Long term goal 4: Functional active mobility to reach back /base of head for hair care and IR to touch mid back/don coat  Long term goal 5: Strength right UE to carry gallon of mild at waist height    Patient's Goal:    Get back to activity and use my arm again    Timed Code Treatment Minutes: 0 Minutes  Total Treatment Time: 50     Time In: 1350  Time Out: Adama Ford 125, PT Date: 10/6/2021

## 2021-10-08 ENCOUNTER — HOSPITAL ENCOUNTER (OUTPATIENT)
Dept: PHYSICAL THERAPY | Age: 51
Setting detail: THERAPIES SERIES
Discharge: HOME | End: 2021-10-08
Payer: COMMERCIAL

## 2021-10-08 PROCEDURE — 97110 THERAPEUTIC EXERCISES: CPT

## 2021-10-08 ASSESSMENT — PAIN SCALES - GENERAL: PAINLEVEL_OUTOF10: 6

## 2021-10-08 NOTE — PROGRESS NOTES
Phone: Jayce Reyes      Fax: [phone]                            Outpatient Physical Therapy                                                                            Daily Note    Date: 10/8/2021  Patient Name: Karuna Joyner        MRN: [id number]   ACCT#:  [de-identified]  : 1970  (54 y.o.)    Referring Practitioner: Dr. Cathie Diggs    Referral Date : 10/04/21    Diagnosis: Right shoulder debridement  Treatment Diagnosis: Right shoulder surgery    Onset Date: 10/04/21  Total # of Visits Approved: 24 Per Physician Order  Total # of Visits to Date: 2  No Show: 0  Canceled Appointment: 0  Plan of Care/Certification Expiration Date: 21    Pre-Treatment Pain:  6/10     Assessment  Assessment: Patient reports R shoulder pain is a 6/10 this afternoon. Patient reports removing pain pump yesterday. She states it hadn't complete run out yet, but notes it was becoming too uncomfortable in her neck. Initiated gentle ROM exercises as outlined. R shoulder PROM: flex = 130 deg, ABD = 102 deg, ER @45 = 45 deg, and IR @45 = 50 deg. Will progress as able.   Chart Reviewed: Yes    Plan  Plan: Continue with current plan    Exercises/Modalities/Manual:  See DocFlow Sheet    Education:     Goals  (Total # of Visits to Date: 2)   Short Term Goals - Time Frame for Short term goals: 10 visits  Short term goal 1: Educate on home program of shoulder/scapular ROM as well as distal extremity ROM/strengthening  Short term goal 2: PROM shoulder flex 100 deg  Short term goal 3: PROM shoulder abduction to 100 deg  Short term goal 4: PROM ER 50 deg    Long Term Goals - Time Frame for Long term goals : 20 visits  Long term goal 1: Upgrade home program for shoulder and scapular strengthening  Long term goal 2: PROM flexion and abduction > 150 deg  Long term goal 3: PROM ER > 65 deg @ 90 deg abd  Long term goal 4: Functional active mobility to reach back /base of head for hair care and IR to touch mid back/don coat  Long term goal 5: Strength right UE to carry gallon of mild at waist height    Post Treatment Pain:  6/10    Time In: 1445    Time Out : 1525   Timed Code Treatment Minutes: 40 Minutes  Total Treatment Time: 36 Minutes    Vincent Singletary [place]     Date: 10/8/2021

## 2021-10-12 ENCOUNTER — HOSPITAL ENCOUNTER (OUTPATIENT)
Dept: PHYSICAL THERAPY | Age: 51
Setting detail: THERAPIES SERIES
Discharge: HOME | End: 2021-10-12
Payer: COMMERCIAL

## 2021-10-12 PROCEDURE — 97110 THERAPEUTIC EXERCISES: CPT

## 2021-10-12 ASSESSMENT — PAIN SCALES - GENERAL: PAINLEVEL_OUTOF10: 3

## 2021-10-12 NOTE — PROGRESS NOTES
Phone: [address] [facility]      Fax: [phone]                            Outpatient Physical Therapy                                                                            Daily Note    Date: 10/12/2021  Patient Name: Lynette Arreola        MRN: [id number]   ACCT#:  [de-identified]  : 1970  (54 y.o.)    Referring Practitioner: Dr. Missy Fuller    Referral Date : 10/04/21    Diagnosis: Right shoulder debridement  Treatment Diagnosis: Right shoulder surgery    Onset Date: 10/04/21  Total # of Visits Approved: 24 Per Physician Order  Total # of Visits to Date: 3  No Show: 0  Canceled Appointment: 0  Plan of Care/Certification Expiration Date: 21    Pre-Treatment Pain:  310     Assessment  Assessment: Patient reports R shoulder pain is a 3/10 this morning. She notes she didn't feel too sore following last visit. Added light strengthening and AAROM exercises this morning with good tolerance from patient. R shoulder PROM: flex = 155 deg, ABD = 120 deg, ER @90 = 68 deg, and IR @90 = 50 deg. Post session patient notes R shoulder pain is a 1-2/10.   Chart Reviewed: Yes    Plan  Plan: Continue with current plan    Exercises/Modalities/Manual:  See DocFlow Sheet    Education:     Goals  (Total # of Visits to Date: 3)   Short Term Goals - Time Frame for Short term goals: 10 visits  Short term goal 1: Educate on home program of shoulder/scapular ROM as well as distal extremity ROM/strengthening  Short term goal 2: PROM shoulder flex 100 deg  Short term goal 3: PROM shoulder abduction to 100 deg  Short term goal 4: PROM ER 50 deg    Long Term Goals - Time Frame for Long term goals : 20 visits  Long term goal 1: Upgrade home program for shoulder and scapular strengthening  Long term goal 2: PROM flexion and abduction > 150 deg  Long term goal 3: PROM ER > 65 deg @ 90 deg abd  Long term goal 4: Functional active mobility to reach back /base of head for hair care and IR to touch mid back/don coat  Long term goal 5: Strength right UE to carry gallon of mild at waist height    Post Treatment Pain:  1-2/10    Time In: 1035    Time Out : 1115   Timed Code Treatment Minutes: 40 Minutes  Total Treatment Time: 36 Minutes    Airam Dacosta [place]     Date: 10/12/2021

## 2021-10-14 ENCOUNTER — HOSPITAL ENCOUNTER (OUTPATIENT)
Dept: PHYSICAL THERAPY | Age: 51
Setting detail: THERAPIES SERIES
Discharge: HOME | End: 2021-10-14
Payer: COMMERCIAL

## 2021-10-14 PROCEDURE — 97110 THERAPEUTIC EXERCISES: CPT

## 2021-10-14 ASSESSMENT — PAIN SCALES - GENERAL: PAINLEVEL_OUTOF10: 3

## 2021-10-14 NOTE — PROGRESS NOTES
Phone: Jayce Reyes      Fax: [phone]                            Outpatient Physical Therapy                                                                            Daily Note    Date: 10/14/2021  Patient Name: Joshua Marroquin        MRN: [id number]   ACCT#:  [de-identified]  : 1970  (54 y.o.)    Referring Practitioner: Dr. Yates Has    Referral Date : 10/04/21    Diagnosis: Right shoulder debridement  Treatment Diagnosis: Right shoulder surgery    Onset Date: 10/04/21  Total # of Visits Approved: 24 Per Physician Order  Total # of Visits to Date: 4  No Show: 0  Canceled Appointment: 0  Plan of Care/Certification Expiration Date: 21    Pre-Treatment Pain:  3/10     Assessment  Assessment: Patient subjectively rates pain 3/10. Compliant with home pullies and scapular tband ex. PROM ER/IR @ 90 deg abduction is generally full. PROM flex 155 deg. Tightness noted surrounding right shoulder region.    To MD 10/18/21 for staple removal  Chart Reviewed: Yes    Plan  Plan: Continue with current plan    Exercises/Modalities/Manual:  See DocFlow Sheet    Education: [address] walking for shoulder ROM          Goals  (Total # of Visits to Date: 4)   Short Term Goals - Time Frame for Short term goals: 10 visits  Short term goal 1: Educate on home program of shoulder/scapular ROM as well as distal extremity ROM/strengthening- MET  Short term goal 2: PROM shoulder flex 100 deg- MET  Short term goal 3: PROM shoulder abduction to 100 deg- MET  Short term goal 4: PROM ER 50 deg- MET       Long Term Goals - Time Frame for Long term goals : 20 visits  Long term goal 1: Upgrade home program for shoulder and scapular strengthening  Long term goal 2: PROM flexion and abduction > 150 deg  Long term goal 3: PROM ER > 65 deg @ 90 deg abd  Long term goal 4: Functional active mobility to reach back /base of head for hair care and IR to touch mid back/don coat  Long term goal 5: Strength right UE to carry gallon of mild at waist height    Post Treatment Pain:  3/10    Time In: 1305    Time Out : 1345        Timed Code Treatment Minutes: 40 Minutes  Total Treatment Time: 36 Minutes    JUAN CORTÉS, JUANITA     Date: 10/14/2021

## 2021-10-19 ENCOUNTER — HOSPITAL ENCOUNTER (OUTPATIENT)
Dept: PHYSICAL THERAPY | Age: 51
Setting detail: THERAPIES SERIES
Discharge: HOME | End: 2021-10-19
Payer: COMMERCIAL

## 2021-10-19 PROCEDURE — 97110 THERAPEUTIC EXERCISES: CPT

## 2021-10-19 ASSESSMENT — PAIN SCALES - GENERAL: PAINLEVEL_OUTOF10: 5

## 2021-10-19 NOTE — PROGRESS NOTES
Phone: Jayce Reyes      Fax: [phone]                            Outpatient Physical Therapy                                                                            Daily Note    Date: 10/19/2021  Patient Name: Erendira Vinson        MRN: [id number]   ACCT#:  [de-identified]  : 1970  (54 y.o.)    Referring Practitioner: Dr. Ramírez Lan    Referral Date : 10/04/21    Diagnosis: Right shoulder debridement  Treatment Diagnosis: Right shoulder surgery    Onset Date: 10/04/21  Total # of Visits Approved: 24 Per Physician Order  Total # of Visits to Date: 5  No Show: 0  Canceled Appointment: 0  Plan of Care/Certification Expiration Date: 21    Pre-Treatment Pain:  5/10     Assessment  Assessment: Patient arrives reporting pain 5/10. Increased pain blamed on earlier appointment with physician stating UE moved around a lot. Pt completed exercises per log ending with PROM PROM ABD = 120 degrees. Plan to continue with current POC to address ROM and strengthening.   Chart Reviewed: Yes    Plan  Plan: Continue with current plan    Exercises/Modalities/Manual:  See DocFlow Sheet    Education:           Goals  (Total # of Visits to Date: 5)   Short Term Goals - Time Frame for Short term goals: 10 visits  Short term goal 1: Educate on home program of shoulder/scapular ROM as well as distal extremity ROM/strengthening- MET  Short term goal 2: PROM shoulder flex 100 deg- MET  Short term goal 3: PROM shoulder abduction to 100 deg- MET  Short term goal 4: PROM ER 50 deg- MET       Long Term Goals - Time Frame for Long term goals : 20 visits  Long term goal 1: Upgrade home program for shoulder and scapular strengthening  Long term goal 2: PROM flexion and abduction > 150 deg  Long term goal 3: PROM ER > 65 deg @ 90 deg abd  Long term goal 4: Functional active mobility to reach back /base of head for hair care and IR to touch mid back/don coat  Long term goal 5: Strength right UE

## 2021-10-28 ENCOUNTER — HOSPITAL ENCOUNTER (OUTPATIENT)
Dept: PHYSICAL THERAPY | Age: 51
Setting detail: THERAPIES SERIES
Discharge: HOME | End: 2021-10-28
Payer: COMMERCIAL

## 2021-10-28 PROCEDURE — 97110 THERAPEUTIC EXERCISES: CPT

## 2021-10-28 ASSESSMENT — PAIN SCALES - GENERAL: PAINLEVEL_OUTOF10: 3

## 2021-10-28 NOTE — PROGRESS NOTES
Phone: Jayce Reyes      Fax: [phone]                            Outpatient Physical Therapy                                                                            Daily Note    Date: 10/28/2021  Patient Name: Lynette Arreola        MRN: [id number]   ACCT#:  [de-identified]  : 1970  (54 y.o.)    Referring Practitioner: Dr. Missy Fuller    Referral Date : 10/04/21    Diagnosis: Right shoulder debridement  Treatment Diagnosis: Right shoulder surgery    Onset Date: 10/04/21  Total # of Visits Approved: 24 Per Physician Order  Total # of Visits to Date: 7  No Show: 0  Canceled Appointment: 0  Plan of Care/Certification Expiration Date: 21    Pre-Treatment Pain:  3/10     Assessment  Assessment: Patient reports R shoulder pain is a 3/10 this morning. Progressed exercises as outlined with good tolerance from patient. R shoulder PROM: flex = 150 deg, ABD = 116 deg, ER @90 = 60 deg, and IR @90 = 55 deg. Will continue to progress as tolerated.   Chart Reviewed: Yes    Plan  Plan: Continue with current plan    Exercises/Modalities/Manual:  See DocFlow Sheet    Education:     Goals  (Total # of Visits to Date: 7)   Short Term Goals - Time Frame for Short term goals: 10 visits  Short term goal 1: Educate on home program of shoulder/scapular ROM as well as distal extremity ROM/strengthening - MET  Short term goal 2: PROM shoulder flex 100 deg - MET  Short term goal 3: PROM shoulder abduction to 100 deg - MET  Short term goal 4: PROM ER 50 deg - MET    Long Term Goals - Time Frame for Long term goals : 20 visits  Long term goal 1: Upgrade home program for shoulder and scapular strengthening  Long term goal 2: PROM flexion and abduction > 150 deg  Long term goal 3: PROM ER > 65 deg @ 90 deg abd  Long term goal 4: Functional active mobility to reach back /base of head for hair care and IR to touch mid back/don coat  Long term goal 5: Strength right UE to carry gallon of mild at waist height    Post Treatment Pain:  3/10    Time In: 1120    Time Out : 1205   Timed Code Treatment Minutes: 45 Minutes  Total Treatment Time: [address] [place] [address]     Date: 10/28/2021

## 2021-11-02 ENCOUNTER — HOSPITAL ENCOUNTER (OUTPATIENT)
Dept: PHYSICAL THERAPY | Age: 51
Setting detail: THERAPIES SERIES
Discharge: HOME | End: 2021-11-02
Payer: COMMERCIAL

## 2021-11-02 PROCEDURE — 97110 THERAPEUTIC EXERCISES: CPT

## 2021-11-02 ASSESSMENT — PAIN SCALES - GENERAL: PAINLEVEL_OUTOF10: 3

## 2021-11-02 NOTE — PROGRESS NOTES
Phone: [address]      Fax: [phone]                            Outpatient Physical Therapy                                                                            Daily Note    Date: 2021  Patient Name: Russell Wheat        MRN: [id number]   ACCT#:  [de-identified]  : 1970  (54 y.o.)    Referring Practitioner: Dr. Shaye Mcmahan    Referral Date : 10/04/21    Diagnosis: Right shoulder debridement  Treatment Diagnosis: Right shoulder surgery    Onset Date: 10/04/21  Total # of Visits Approved: 24 Per Physician Order  Total # of Visits to Date: 8  No Show: 0  Plan of Care/Certification Expiration Date: 21    Pre-Treatment Pain:  3/10     Assessment  Assessment: 3/10 pain. Notes discomfort primarily of proximal shld/top of shld and clavicle. Signficant tenderness along SC jt with mobs. Limited flexion end ranges.    Progress with strengthening and manual therapy to address clavicle mobs to improve overhead flexion  Chart Reviewed: Yes    Plan  Plan: Continue with current plan    Exercises/Modalities/Manual:  See DocFlow Sheet    Education:           Goals  (Total # of Visits to Date: 8)   Short Term Goals - Time Frame for Short term goals: 10 visits  Short term goal 1: Educate on home program of shoulder/scapular ROM as well as distal extremity ROM/strengthening - MET  Short term goal 2: PROM shoulder flex 100 deg - MET  Short term goal 3: PROM shoulder abduction to 100 deg - MET  Short term goal 4: PROM ER 50 deg - MET       Long Term Goals - Time Frame for Long term goals : 20 visits  Long term goal 1: Upgrade home program for shoulder and scapular strengthening  Long term goal 2: PROM flexion and abduction > 150 deg  Long term goal 3: PROM ER > 65 deg @ 90 deg abd  Long term goal 4: Functional active mobility to reach back /base of head for hair care and IR to touch mid back/don coat  Long term goal 5: Strength right UE to carry gallon of mild at waist height    Post Treatment Pain:  3/10    Time In: 1020    Time Out : 1100        Timed Code Treatment Minutes: 40 Minutes  Total Treatment Time: [address], [place]     Date: 11/2/2021

## 2021-11-04 ENCOUNTER — HOSPITAL ENCOUNTER (OUTPATIENT)
Dept: PHYSICAL THERAPY | Age: 51
Setting detail: THERAPIES SERIES
Discharge: HOME | End: 2021-11-04
Payer: COMMERCIAL

## 2021-11-04 PROCEDURE — 97110 THERAPEUTIC EXERCISES: CPT

## 2021-11-04 ASSESSMENT — PAIN SCALES - GENERAL: PAINLEVEL_OUTOF10: 4

## 2021-11-04 NOTE — PROGRESS NOTES
Phone: Jayce Reyes      Fax: [phone]                            Outpatient Physical Therapy                                                                            Daily Note    Date: 2021  Patient Name: More Mckeon        MRN: [id number]   ACCT#:  [de-identified]  : 1970  (54 y.o.)    Referring Practitioner: Dr. Augie Shaffer    Referral Date : 10/04/21    Diagnosis: Right shoulder debridement  Treatment Diagnosis: Right shoulder surgery    Onset Date: 10/04/21  Total # of Visits Approved: 24 Per Physician Order  Total # of Visits to Date: 9  No Show: 0  Canceled Appointment: 0  Plan of Care/Certification Expiration Date: 21    Pre-Treatment Pain:  3/10     Assessment  Assessment: Patient reports R shoulder pain is a 3/10 this morning. Completed exercises as outlined with good tolerance from patient. R shoulder PROM: flex = 155 deg, ABD = 122 deg, ER @90 = 62 deg, and IR @90 = 55 deg. Post session patient notes pain is a 3/10.   Chart Reviewed: Yes    Plan  Plan: Continue with current plan    Exercises/Modalities/Manual:  See DocFlow Sheet    Education:     Goals  (Total # of Visits to Date: 5)   Short Term Goals - Time Frame for Short term goals: 10 visits  Short term goal 1: Educate on home program of shoulder/scapular ROM as well as distal extremity ROM/strengthening - MET  Short term goal 2: PROM shoulder flex 100 deg - MET  Short term goal 3: PROM shoulder abduction to 100 deg - MET  Short term goal 4: PROM ER 50 deg - MET    Long Term Goals - Time Frame for Long term goals : 20 visits  Long term goal 1: Upgrade home program for shoulder and scapular strengthening  Long term goal 2: PROM flexion and abduction > 150 deg  Long term goal 3: PROM ER > 65 deg @ 90 deg abd  Long term goal 4: Functional active mobility to reach back /base of head for hair care and IR to touch mid back/don coat  Long term goal 5: Strength right UE to carry gallon of mild at waist height    Post Treatment Pain:  3/10    Time In: 0950    Time Out : 1030   Timed Code Treatment Minutes: 40 Minutes  Total Treatment Time: 36 Minutes    Phoenix Gordon     Date: 11/4/2021

## 2021-11-09 ENCOUNTER — HOSPITAL ENCOUNTER (OUTPATIENT)
Dept: PHYSICAL THERAPY | Age: 51
Setting detail: THERAPIES SERIES
Discharge: HOME | End: 2021-11-09
Payer: COMMERCIAL

## 2021-11-09 PROCEDURE — 97110 THERAPEUTIC EXERCISES: CPT

## 2021-11-09 ASSESSMENT — PAIN SCALES - GENERAL: PAINLEVEL_OUTOF10: 5

## 2021-11-09 NOTE — PROGRESS NOTES
Phone: Jayce Reyes      Fax: [phone]                            Outpatient Physical Therapy                                                                            Daily Note    Date: 2021  Patient Name: Mahsa Reece        MRN: [id number]   ACCT#:  [de-identified]  : 1970  (54 y.o.)    Referring Practitioner: Dr. Nadiya Dorsey    Referral Date : 10/04/21    Diagnosis: Right shoulder debridement  Treatment Diagnosis: Right shoulder surgery    Onset Date: 10/04/21  Total # of Visits Approved: 24 Per Physician Order  Total # of Visits to Date: 10  No Show: 0  Canceled Appointment: 0  Plan of Care/Certification Expiration Date: 21    Pre-Treatment Pain:  5/10     Assessment  Assessment: Continues to note right shoulder and clavicle pain. Limited end range mobility into flexion and also abduction; improved mildly with clavicle mobs during movement. Patient to see Dr. New Cisneros 21.     Chart Reviewed: Yes    Plan  Plan: Continue with current plan    Exercises/Modalities/Manual:  See DocFlow Sheet    Education:           Goals  (Total # of Visits to Date: 8)   Short Term Goals - Time Frame for Short term goals: 10 visits  Short term goal 1: Educate on home program of shoulder/scapular ROM as well as distal extremity ROM/strengthening - MET  Short term goal 2: PROM shoulder flex 100 deg - MET  Short term goal 3: PROM shoulder abduction to 100 deg - MET  Short term goal 4: PROM ER 50 deg - MET       Long Term Goals - Time Frame for Long term goals : 20 visits  Long term goal 1: Upgrade home program for shoulder and scapular strengthening  Long term goal 2: PROM flexion and abduction > 150 deg  Long term goal 3: PROM ER > 65 deg @ 90 deg abd  Long term goal 4: Functional active mobility to reach back /base of head for hair care and IR to touch mid back/don coat  Long term goal 5: Strength right UE to carry gallon of mild at waist height    Post

## 2021-11-12 ENCOUNTER — APPOINTMENT (OUTPATIENT)
Dept: PHYSICAL THERAPY | Age: 51
End: 2021-11-12
Payer: COMMERCIAL

## 2021-11-15 ENCOUNTER — HOSPITAL ENCOUNTER (OUTPATIENT)
Dept: PHYSICAL THERAPY | Age: 51
Setting detail: THERAPIES SERIES
Discharge: HOME | End: 2021-11-15
Payer: COMMERCIAL

## 2021-11-15 PROCEDURE — G0283 ELEC STIM OTHER THAN WOUND: HCPCS

## 2021-11-15 PROCEDURE — 97110 THERAPEUTIC EXERCISES: CPT

## 2021-11-15 ASSESSMENT — PAIN SCALES - GENERAL: PAINLEVEL_OUTOF10: 6

## 2021-11-15 NOTE — PROGRESS NOTES
Phone: [address]            Fax: [phone]                             Outpatient Physical Therapy                                                                      Progress Report    Date: 11/15/2021   MRN: [id number]    ACCT#: [de-identified]  Patient: Beba Liao  : 1970  Referring Practitioner: Dr. Sj Bond    Referral Date : 10/04/21    Diagnosis: Right shoulder debridement    Treatment Diagnosis: Right shoulder surgery  Onset Date: 10/04/21  Total # of Visits Approved: 24 Per Physician Order  Total # of Visits to Date: 6  Canceled Appointment: 1  Assessment: Patient arrived stating increased pain (rated 6/10) with progressively worsening ROM. Pain persists along clavicle both SC and AC as well as anterior shoulder. AROM limited to 90 deg flexion. PROM flexion 155 deg;  abduction 120 deg; ER 45 deg and IR 60 deg. Please advise of new orders.   Plan: Continue with current plan    Goals:  Time Frame for Short term goals: 10 visits  Short term goal 1: Educate on home program of shoulder/scapular ROM as well as distal extremity ROM/strengthening - MET  Short term goal 2: PROM shoulder flex 100 deg - MET  Short term goal 3: PROM shoulder abduction to 100 deg - MET  Short term goal 4: PROM ER 50 deg - MET    Time Frame for Long term goals : 20 visits  Long term goal 1: Upgrade home program for shoulder and scapular strengthening  Long term goal 2: PROM flexion and abduction > 150 deg  Long term goal 3: PROM ER > 65 deg @ 90 deg abd  Long term goal 4: Functional active mobility to reach back /base of head for hair care and IR to touch mid back/don coat  Long term goal 5: Strength right UE to carry gallon of mild at waist height    JUAN CORTÉS, PT    Date: 11/15/2021

## 2021-11-15 NOTE — PROGRESS NOTES
Phone: Jayce Reyes      Fax: [phone]                            Outpatient Physical Therapy                                                                            Daily Note    Date: 11/15/2021  Patient Name: George Boswell        MRN: [id number]   ACCT#:  [de-identified]  : 1970  (54 y.o.)    Referring Practitioner: Dr. Rutyh Hilario    Referral Date : 10/04/21    Diagnosis: Right shoulder debridement  Treatment Diagnosis: Right shoulder surgery    Onset Date: 10/04/21  Total # of Visits Approved: 24 Per Physician Order  Total # of Visits to Date: 11  No Show: 0  Canceled Appointment: 1  Plan of Care/Certification Expiration Date: 21    Pre-Treatment Pain:  6/10     Assessment  Assessment: Patient arrived stating increased pain (rated 6/10) with progressively worsening ROM. Pain persists along clavicle both SC and AC as well as anterior shoulder. AROM limited to 90 deg flexion. PROM flexion 155 deg;  abduction 120 deg; ER 45 deg and IR 60 deg. To Dr. Triston Landeros tomorrow 21.   Await orders  Chart Reviewed: Yes    Plan  Plan: Continue with current plan    Exercises/Modalities/Manual:  See DocFlow Sheet    Education:           Goals  (Total # of Visits to Date: 6)   Short Term Goals - Time Frame for Short term goals: 10 visits  Short term goal 1: Educate on home program of shoulder/scapular ROM as well as distal extremity ROM/strengthening - MET  Short term goal 2: PROM shoulder flex 100 deg - MET  Short term goal 3: PROM shoulder abduction to 100 deg - MET  Short term goal 4: PROM ER 50 deg - MET       Long Term Goals - Time Frame for Long term goals : 20 visits  Long term goal 1: Upgrade home program for shoulder and scapular strengthening  Long term goal 2: PROM flexion and abduction > 150 deg  Long term goal 3: PROM ER > 65 deg @ 90 deg abd  Long term goal 4: Functional active mobility to reach back /base of head for hair care and IR to touch mid

## 2021-11-17 ENCOUNTER — HOSPITAL ENCOUNTER (OUTPATIENT)
Dept: PHYSICAL THERAPY | Age: 51
Setting detail: THERAPIES SERIES
Discharge: HOME | End: 2021-11-17
Payer: COMMERCIAL

## 2021-11-17 PROCEDURE — 97110 THERAPEUTIC EXERCISES: CPT

## 2021-11-17 ASSESSMENT — PAIN SCALES - GENERAL: PAINLEVEL_OUTOF10: 4

## 2021-11-17 NOTE — PROGRESS NOTES
Phone: Jayce Reyes      Fax: [phone]                            Outpatient Physical Therapy                                                                            Daily Note    Date: 2021  Patient Name: Itz King        MRN: [id number]   ACCT#:  [de-identified]  : 1970  (54 y.o.)    Referring Practitioner: Dr. Stephanie Magaña    Referral Date : 10/04/21    Diagnosis: Right shoulder debridement  Treatment Diagnosis: Right shoulder surgery    Onset Date: 10/04/21  Total # of Visits Approved: 24 Per Physician Order  Total # of Visits to Date: 12  No Show: 0  Canceled Appointment: 1  Plan of Care/Certification Expiration Date: 21    Pre-Treatment Pain:  4/10     Assessment  Assessment: Patient rates pain today prior to session as 4/10 when not using it. She reports that surgeon would like to try heat prior to ROM. Placed heat on R shoulder x 8 minutes followed by manual therapy and PROM. PROM R shoulder flexion 153 degrees;  degrees.   Chart Reviewed: Yes    Plan  Plan: Continue with current plan    Exercises/Modalities/Manual:  See DocFlow Sheet    Education:           Goals  (Total # of Visits to Date: 15)   Short Term Goals - Time Frame for Short term goals: 10 visits  Short term goal 1: Educate on home program of shoulder/scapular ROM as well as distal extremity ROM/strengthening - MET  Short term goal 2: PROM shoulder flex 100 deg - MET  Short term goal 3: PROM shoulder abduction to 100 deg - MET  Short term goal 4: PROM ER 50 deg - MET       Long Term Goals - Time Frame for Long term goals : 20 visits  Long term goal 1: Upgrade home program for shoulder and scapular strengthening  Long term goal 2: PROM flexion and abduction > 150 deg  Long term goal 3: PROM ER > 65 deg @ 90 deg abd  Long term goal 4: Functional active mobility to reach back /base of head for hair care and IR to touch mid back/don coat  Long term goal 5: Strength right UE to carry gallon of mild at waist height    Post Treatment Pain:  5/10    Time In: 1249   Time Out : 1325        Timed Code Treatment Minutes: 28 Minutes  Total Treatment Time: 36 Minutes    Pool Cancel     Date: 11/17/2021

## 2021-11-22 ENCOUNTER — HOSPITAL ENCOUNTER (OUTPATIENT)
Dept: PHYSICAL THERAPY | Age: 51
Setting detail: THERAPIES SERIES
Discharge: HOME | End: 2021-11-22
Payer: COMMERCIAL

## 2021-11-22 PROCEDURE — G0283 ELEC STIM OTHER THAN WOUND: HCPCS

## 2021-11-22 PROCEDURE — 97140 MANUAL THERAPY 1/> REGIONS: CPT

## 2021-11-22 PROCEDURE — 97110 THERAPEUTIC EXERCISES: CPT

## 2021-11-22 ASSESSMENT — PAIN SCALES - GENERAL: PAINLEVEL_OUTOF10: 4

## 2021-11-22 NOTE — PROGRESS NOTES
Phone: Jayce Reyes      Fax: [phone]                            Outpatient Physical Therapy                                                                            Daily Note    Date: 2021  Patient Name: George Boswell        MRN: [id number]   ACCT#:  [de-identified]  : 1970  (54 y.o.)    Referring Practitioner: Dr. Ruthy Hilario    Referral Date : 10/04/21    Diagnosis: Right shoulder debridement  Treatment Diagnosis: Right shoulder surgery    Onset Date: 10/04/21  Total # of Visits Approved: 24 Per Physician Order  Total # of Visits to Date: 13  No Show: 0  Canceled Appointment: 1  Plan of Care/Certification Expiration Date: 21    Pre-Treatment Pain:  4/10     Assessment  Assessment: Patient continues to note 4/10 R shoulder pain this afternoon. She states she still has difficulty sleeping at night. Educated patient on using towel roll or pillow between side and arm. Applied HP prior to PROM. R shoulder PROM: flex = 155 deg, ABD = 138 deg, and ER @90 = 50 deg. Post IFC patient notes R shoulder pain is a /10.   Chart Reviewed: Yes    Plan  Plan: Continue with current plan    Exercises/Modalities/Manual:  See DocFlow Sheet    Education:     Goals  (Total # of Visits to Date: 15)   Short Term Goals - Time Frame for Short term goals: 10 visits  Short term goal 1: Educate on home program of shoulder/scapular ROM as well as distal extremity ROM/strengthening - MET  Short term goal 2: PROM shoulder flex 100 deg - MET  Short term goal 3: PROM shoulder abduction to 100 deg - MET  Short term goal 4: PROM ER 50 deg - MET       Long Term Goals - Time Frame for Long term goals : 20 visits  Long term goal 1: Upgrade home program for shoulder and scapular strengthening  Long term goal 2: PROM flexion and abduction > 150 deg  Long term goal 3: PROM ER > 65 deg @ 90 deg abd  Long term goal 4: Functional active mobility to reach back /base of head for hair care and IR to touch mid back/don coat  Long term goal 5: Strength right UE to carry gallon of mild at waist height    Post Treatment Pain:  3/10    Time In: 1520    Time Out : 1600   Timed Code Treatment Minutes: 25 Minutes  Total Treatment Time: 36 Minutes    Jeri Sosa [place]     Date: 11/22/2021

## 2021-11-26 ENCOUNTER — APPOINTMENT (OUTPATIENT)
Dept: GENERAL RADIOLOGY | Age: 51
End: 2021-11-26
Payer: COMMERCIAL

## 2021-11-26 ENCOUNTER — HOSPITAL ENCOUNTER (EMERGENCY)
Age: 51
Discharge: HOME | End: 2021-11-27
Attending: EMERGENCY MEDICINE
Payer: COMMERCIAL

## 2021-11-26 DIAGNOSIS — R00.0 SINUS TACHYCARDIA: Primary | ICD-10-CM

## 2021-11-26 DIAGNOSIS — T65.94XA INGESTION OF SUBSTANCE, UNDETERMINED INTENT, INITIAL ENCOUNTER: ICD-10-CM

## 2021-11-26 DIAGNOSIS — Z86.59 MENTAL STATUS CHANGE RESOLVED: ICD-10-CM

## 2021-11-26 LAB
ABSOLUTE EOS #: 0.4 K/UL (ref 0–0.4)
ABSOLUTE IMMATURE GRANULOCYTE: ABNORMAL K/UL (ref 0–0.3)
ABSOLUTE LYMPH #: 3.4 K/UL (ref 1–4.8)
ABSOLUTE MONO #: 0.6 K/UL (ref 0–1)
ANION GAP SERPL CALCULATED.3IONS-SCNC: 13 MMOL/L (ref 9–17)
BASOPHILS # BLD: 1 % (ref 0–2)
BASOPHILS ABSOLUTE: 0.1 K/UL (ref 0–0.2)
BUN BLDV-MCNC: 12 MG/DL (ref 6–20)
BUN/CREAT BLD: 19 (ref 9–20)
CALCIUM SERPL-MCNC: 9.4 MG/DL (ref 8.6–10.4)
CHLORIDE BLD-SCNC: 106 MMOL/L (ref 98–107)
CO2: 23 MMOL/L (ref 20–31)
CREAT SERPL-MCNC: 0.62 MG/DL (ref 0.5–0.9)
D-DIMER QUANTITATIVE: 0.71 MG/L FEU (ref 0–0.59)
DIFFERENTIAL TYPE: ABNORMAL
EOSINOPHILS RELATIVE PERCENT: 5 % (ref 0–5)
GFR AFRICAN AMERICAN: >60 ML/MIN
GFR NON-AFRICAN AMERICAN: >60 ML/MIN
GFR SERPL CREATININE-BSD FRML MDRD: ABNORMAL ML/MIN/{1.73_M2}
GFR SERPL CREATININE-BSD FRML MDRD: ABNORMAL ML/MIN/{1.73_M2}
GLUCOSE BLD-MCNC: 137 MG/DL (ref 70–99)
HCT VFR BLD CALC: 41.6 % (ref 36–46)
HEMOGLOBIN: 14.3 G/DL (ref 12–16)
IMMATURE GRANULOCYTES: ABNORMAL %
LYMPHOCYTES # BLD: 43 % (ref 15–40)
MCH RBC QN AUTO: 28.6 PG (ref 26–34)
MCHC RBC AUTO-ENTMCNC: 34.3 G/DL (ref 31–37)
MCV RBC AUTO: 83.5 FL (ref 80–100)
MONOCYTES # BLD: 7 % (ref 4–8)
MORPHOLOGY: ABNORMAL
MORPHOLOGY: ABNORMAL
NRBC AUTOMATED: ABNORMAL PER 100 WBC
PDW BLD-RTO: 13.2 % (ref 12.1–15.2)
PLATELET # BLD: 49 K/UL (ref 140–450)
PLATELET ESTIMATE: ABNORMAL
PMV BLD AUTO: ABNORMAL FL (ref 6–12)
POTASSIUM SERPL-SCNC: 3.4 MMOL/L (ref 3.7–5.3)
RBC # BLD: 4.98 M/UL (ref 4–5.2)
RBC # BLD: ABNORMAL 10*6/UL
SEG NEUTROPHILS: 44 % (ref 47–75)
SEGMENTED NEUTROPHILS ABSOLUTE COUNT: 3.5 K/UL (ref 2.5–7)
SODIUM BLD-SCNC: 142 MMOL/L (ref 135–144)
TROPONIN INTERP: NORMAL
TROPONIN T: NORMAL NG/ML
TROPONIN, HIGH SENSITIVITY: <6 NG/L (ref 0–14)
WBC # BLD: 7.9 K/UL (ref 3.5–11)
WBC # BLD: ABNORMAL 10*3/UL

## 2021-11-26 PROCEDURE — 2500000003 HC RX 250 WO HCPCS: Performed by: EMERGENCY MEDICINE

## 2021-11-26 PROCEDURE — 96374 THER/PROPH/DIAG INJ IV PUSH: CPT

## 2021-11-26 PROCEDURE — 71045 X-RAY EXAM CHEST 1 VIEW: CPT

## 2021-11-26 PROCEDURE — 84484 ASSAY OF TROPONIN QUANT: CPT

## 2021-11-26 PROCEDURE — 85025 COMPLETE CBC W/AUTO DIFF WBC: CPT

## 2021-11-26 PROCEDURE — 93005 ELECTROCARDIOGRAM TRACING: CPT | Performed by: EMERGENCY MEDICINE

## 2021-11-26 PROCEDURE — 80048 BASIC METABOLIC PNL TOTAL CA: CPT

## 2021-11-26 PROCEDURE — 99284 EMERGENCY DEPT VISIT MOD MDM: CPT

## 2021-11-26 PROCEDURE — 85379 FIBRIN DEGRADATION QUANT: CPT

## 2021-11-26 RX ORDER — METOPROLOL TARTRATE 5 MG/5ML
5 INJECTION INTRAVENOUS ONCE
Status: COMPLETED | OUTPATIENT
Start: 2021-11-26 | End: 2021-11-26

## 2021-11-26 RX ORDER — TRAZODONE HYDROCHLORIDE 50 MG/1
50 TABLET ORAL 2 TIMES DAILY
COMMUNITY
Start: 2021-11-23

## 2021-11-26 RX ADMIN — METOROPROLOL TARTRATE 5 MG: 5 INJECTION, SOLUTION INTRAVENOUS at 23:15

## 2021-11-26 ASSESSMENT — PAIN DESCRIPTION - LOCATION: LOCATION: CHEST

## 2021-11-26 ASSESSMENT — PAIN DESCRIPTION - PAIN TYPE: TYPE: ACUTE PAIN

## 2021-11-26 ASSESSMENT — PAIN SCALES - GENERAL: PAINLEVEL_OUTOF10: 3

## 2021-11-26 ASSESSMENT — PAIN DESCRIPTION - ORIENTATION: ORIENTATION: MID

## 2021-11-27 VITALS
TEMPERATURE: 98.4 F | BODY MASS INDEX: 34.93 KG/M2 | RESPIRATION RATE: 18 BRPM | HEIGHT: 61 IN | WEIGHT: 185 LBS | SYSTOLIC BLOOD PRESSURE: 102 MMHG | OXYGEN SATURATION: 91 % | DIASTOLIC BLOOD PRESSURE: 47 MMHG | HEART RATE: 103 BPM

## 2021-11-27 LAB
AMPHETAMINE SCREEN URINE: NEGATIVE
BARBITURATE SCREEN URINE: NEGATIVE
BENZODIAZEPINE SCREEN, URINE: NEGATIVE
BUPRENORPHINE URINE: ABNORMAL
CANNABINOID SCREEN URINE: POSITIVE
COCAINE METABOLITE, URINE: NEGATIVE
EKG ATRIAL RATE: 150 BPM
EKG P AXIS: 87 DEGREES
EKG P-R INTERVAL: 146 MS
EKG Q-T INTERVAL: 330 MS
EKG QRS DURATION: 96 MS
EKG QTC CALCULATION (BAZETT): 521 MS
EKG R AXIS: -9 DEGREES
EKG T AXIS: 44 DEGREES
EKG VENTRICULAR RATE: 150 BPM
MDMA URINE: ABNORMAL
METHADONE SCREEN, URINE: NEGATIVE
METHAMPHETAMINE, URINE: NEGATIVE
OPIATES, URINE: NEGATIVE
OXYCODONE SCREEN URINE: NEGATIVE
PHENCYCLIDINE, URINE: NEGATIVE
PROPOXYPHENE, URINE: NEGATIVE
TEST INFORMATION: ABNORMAL
TRICYCLIC ANTIDEPRESSANTS, UR: POSITIVE

## 2021-11-27 PROCEDURE — 96374 THER/PROPH/DIAG INJ IV PUSH: CPT

## 2021-11-27 PROCEDURE — 93010 ELECTROCARDIOGRAM REPORT: CPT | Performed by: INTERNAL MEDICINE

## 2021-11-27 PROCEDURE — 6360000002 HC RX W HCPCS

## 2021-11-27 PROCEDURE — 96375 TX/PRO/DX INJ NEW DRUG ADDON: CPT

## 2021-11-27 PROCEDURE — 80306 DRUG TEST PRSMV INSTRMNT: CPT

## 2021-11-27 RX ORDER — LORAZEPAM 2 MG/ML
1 INJECTION INTRAMUSCULAR ONCE
Status: COMPLETED | OUTPATIENT
Start: 2021-11-27 | End: 2021-11-27

## 2021-11-27 RX ORDER — LORAZEPAM 2 MG/ML
INJECTION INTRAMUSCULAR
Status: COMPLETED
Start: 2021-11-27 | End: 2021-11-27

## 2021-11-27 RX ADMIN — LORAZEPAM 1 MG: 2 INJECTION, SOLUTION INTRAMUSCULAR; INTRAVENOUS at 00:29

## 2021-11-27 RX ADMIN — LORAZEPAM 1 MG: 2 INJECTION INTRAMUSCULAR at 00:29

## 2021-11-27 NOTE — ED NOTES
Pt states that son gave her \"something to help her sleep last night\". Pt also states that she took it again tonight and that 30 min after she took it, her heart started racing. Pt states she doesn't know what her son gave her but it was a gummy.      Talon Malik RN  11/26/21 [id number]

## 2021-11-27 NOTE — ED PROVIDER NOTES
[address] COMPLAINT    Chief Complaint   Patient presents with    Tachycardia     Pt brought in by [facility] w/ c/o heart racing and chest pain    Chest Pain       HPI    Lesli Klein is a 54 y.o. female who presentsto ED from home. By EMS. With complaint of rapid heart rate. Onset prior to arrival.  Patient states that she was at home watching TV when she started having a rapid heart rate. Patient states that she took a \"gummy\" about 30 minutes prior to the onset of the symptoms. Patient denies any other new medications. Also had some confusion    PAST MEDICAL HISTORY    Past Medical History:   Diagnosis Date    Headache        SURGICAL HISTORY    Past Surgical History:   Procedure Laterality Date    CHOLECYSTECTOMY  2008    HYSTERECTOMY, TOTAL ABDOMINAL  2017    JOINT REPLACEMENT Left 2015    Left Knee total       CURRENT MEDICATIONS    Current Outpatient Rx   Medication Sig Dispense Refill    traZODone (DESYREL) 50 MG tablet Take 50 mg by mouth 2 times daily      buprenorphine-naloxone (SUBOXONE) 8-2 MG SUBL SL tablet take 1 tablet under the tongue once daily  0    topiramate (TOPAMAX) 50 MG tablet take 2 tablets by mouth at bedtime  0    fluconazole (DIFLUCAN) 150 MG tablet Take 1 tablet by mouth at first sign of yeast infection and repeat in 72 hours for severe infection.  2 tablet 0       ALLERGIES    No Known Allergies    FAMILY HISTORY    Family History   Problem Relation Age of Onset    High Blood Pressure Father        SOCIAL HISTORY    Social History     Socioeconomic History    Marital status: Legally      Spouse name: None    Number of children: None    Years of education: None    Highest education level: None   Occupational History    None   Tobacco Use    Smoking status: Never Smoker    Smokeless tobacco: Never Used   Substance and Sexual Activity    Alcohol use: None    Drug use: None    Sexual activity: None   Other Topics Concern    None   Social History Narrative    None     Social Determinants of Health     Financial Resource Strain:     Difficulty of Paying Living Expenses: Not on file   Food Insecurity:     Worried About Running Out of Food in the Last Year: Not on file    Imer of Food in the Last Year: Not on file   Transportation Needs:     Lack of Transportation (Medical): Not on file    Lack of Transportation (Non-Medical):  Not on file   Physical Activity:     Days of Exercise per Week: Not on file    Minutes of Exercise per Session: Not on file   Stress:     Feeling of Stress : Not on file   Social Connections:     Frequency of Communication with Friends and Family: Not on file    Frequency of Social Gatherings with Friends and Family: Not on file    Attends Taoism Services: Not on file    Active Member of [employer] or Organizations: Not on file    Attends Club or Organization Meetings: Not on file    Marital Status: Not on file   Intimate Partner Violence:     Fear of Current or Ex-Partner: Not on file    Emotionally Abused: Not on file    Physically Abused: Not on file    Sexually Abused: Not on file   Housing Stability:     Unable to Pay for Housing in the Last Year: Not on file    Number of [place] in the Last Year: Not on file    Unstable Housing in the Last Year: Not on file           Review of Systems:  Constitutional:  Denies fever, chills, weight loss or weakness   Eyes:  Denies photophobia or discharge   HENT:  Denies sore throat or ear pain   Respiratory: Positive for chest tightness cardiovascular: Positive for palpitations with rapid heart rate  GI:  Denies abdominal pain, nausea, vomiting, or diarrhea   Musculoskeletal:  Denies back pain   Skin:  Denies rash   Neurologic: Positive for confusion endocrine:  Denies polyuria or polydypsia   Lymphatic:  Denies swollen glands   Psychiatric:  Denies depression, suicidal ideation or homicidal ideation   All systems negative except as marked. PHYSICAL EXAM    VITAL SIGNS: /72   Pulse 105   Temp 98.4 °F (36.9 °C) (Oral)   Resp 20   Ht 5' 1\" (1.549 m)   Wt 185 lb (83.9 kg)   SpO2 92%   BMI 34.96 kg/m²    Constitutional: Ill-appearing female in no acute distress  HENT:  Normocephalic, Atraumatic, Bilateral external ears normal, Oropharynx moist, No oral exudates, Nose normal. Neck- Normal range of motion, No tenderness, Supple, No stridor. Eyes:  PERRL, EOMI, Conjunctiva normal, No discharge. Respiratory:  Normal breath sounds, No respiratory distress, No wheezing, No chest tenderness. Cardiovascular: Regular tachycardic rhythm GI:  Bowel sounds normal, Soft, No tenderness, No masses, No pulsatile masses. : External genitalia appear normal, No masses or lesions. No discharge. No CVA tenderness. Musculoskeletal:  Intact distal pulses, No edema, No tenderness, No cyanosis, No clubbing. Good range of motion in all major joints. No tenderness to palpation or major deformities noted. Back- No tenderness. Integument:  Warm, Dry, No erythema, No rash. Lymphatic:  No lymphadenopathy noted. Neurologic:  Alert & oriented x 3, Normal motor function, Normal sensory function, No focal deficits noted. Psychiatric:  Affect normal, Judgment normal, Mood normal.     EKG    Sinus tachycardia rate of 150 incomplete right bundle branch block    RADIOLOGY    XR CHEST PORTABLE   Final Result      No radiographic evidence for acute chest abnormality.                       PROCEDURES        Labs  Labs Reviewed   CBC WITH AUTO DIFFERENTIAL - Abnormal; Notable for the following components:       Result Value    Platelets 49 (*)     Seg Neutrophils 44 (*)     Lymphocytes 43 (*)     All other components within normal limits   BASIC METABOLIC PANEL - Abnormal; Notable for the following components:    Glucose 137 (*)     Potassium 3.4 (*)     All other components within normal limits   D-DIMER, QUANTITATIVE - Abnormal; Notable for the following components:    D-Dimer, Quant 0.71 (*)     All other components within normal limits   URINE DRUG SCREEN - Abnormal; Notable for the following components:    Cannabinoid Scrn, Ur POSITIVE (*)     Tricyclic Antidepressants, Urine POSITIVE (*)     All other components within normal limits   TROPONIN             Summation      Patient Course: Patient has \"substance ingestion prior to the onset with tachycardia. Patient presents with tachycardia. Patient is given IV fluids and 5 mg metoprolol IV. Patient feels better. Patient was observed in ED for several hours. Patient's heart rate improved and remained. Patient was also given Ativan 1 mg for anxiety. At 2 AM patient feels better. Patient's heart rate is 100. Patient will be discharged home with instructions. The warning signs were discussed. Return to ED if worse. ED Medications administered this visit:    Medications   metoprolol (LOPRESSOR) injection 5 mg (5 mg IntraVENous Given 11/26/21 3042)   LORazepam (ATIVAN) injection 1 mg (1 mg IntraVENous Given 11/27/21 0029)       New Prescriptions from this visit:    New Prescriptions    No medications on file       Follow-up:  No follow-up provider specified. Final Impression:   1. Sinus tachycardia    2. Ingestion of substance, undetermined intent, initial encounter    3.  Mental status change resolved               (Please note that portions of this note were completed with a voice recognition program.  Efforts were made to edit the dictations but occasionally words are mis-transcribed.)        Jermain Durán MD  11/27/21 0148       Jermain Durán MD  11/27/21 0284

## 2021-11-30 ENCOUNTER — HOSPITAL ENCOUNTER (OUTPATIENT)
Dept: PHYSICAL THERAPY | Age: 51
Setting detail: THERAPIES SERIES
Discharge: HOME | End: 2021-11-30
Payer: COMMERCIAL

## 2021-11-30 PROCEDURE — 97140 MANUAL THERAPY 1/> REGIONS: CPT

## 2021-11-30 PROCEDURE — 97110 THERAPEUTIC EXERCISES: CPT

## 2021-11-30 ASSESSMENT — PAIN SCALES - GENERAL: PAINLEVEL_OUTOF10: 2

## 2021-11-30 NOTE — PROGRESS NOTES
Goals - Time Frame for Long term goals : 20 visits  Long term goal 1: Upgrade home program for shoulder and scapular strengthening  Long term goal 2: PROM flexion and abduction > 150 deg  Long term goal 3: PROM ER > 65 deg @ 90 deg abd  Long term goal 4: Functional active mobility to reach back /base of head for hair care and IR to touch mid back/don coat  Long term goal 5: Strength right UE to carry gallon of mild at waist height    Post Treatment Pain:  4/10    Time In: 1035    Time Out : 1110   Timed Code Treatment Minutes: 30 Minutes  Total Treatment Time: 28 Minutes    Westley Almonte [place]     Date: 11/30/2021

## 2021-12-03 ENCOUNTER — HOSPITAL ENCOUNTER (OUTPATIENT)
Dept: PHYSICAL THERAPY | Age: 51
Setting detail: THERAPIES SERIES
Discharge: HOME | End: 2021-12-03
Payer: COMMERCIAL

## 2021-12-07 ENCOUNTER — HOSPITAL ENCOUNTER (OUTPATIENT)
Dept: PHYSICAL THERAPY | Age: 51
Setting detail: THERAPIES SERIES
Discharge: HOME | End: 2021-12-07
Payer: COMMERCIAL

## 2021-12-07 PROCEDURE — 97110 THERAPEUTIC EXERCISES: CPT

## 2021-12-07 ASSESSMENT — PAIN SCALES - GENERAL: PAINLEVEL_OUTOF10: 2

## 2021-12-07 NOTE — PROGRESS NOTES
Phone: [address]      Fax: [phone]                            Outpatient Physical Therapy                                                                            Daily Note    Date: 2021  Patient Name: Anny Rebollar        MRN: [id number]   ACCT#:  [de-identified]  : 1970  (54 y.o.)    Referring Practitioner: Dr. Aviva Kimball    Referral Date : 10/04/21    Diagnosis: Right shoulder debridement  Treatment Diagnosis: Right shoulder surgery    Onset Date: 10/04/21  Total # of Visits Approved: 24 Per Physician Order  Total # of Visits to Date: 17  No Show: 0  Canceled Appointment: 1  Plan of Care/Certification Expiration Date: 21    Pre-Treatment Pain:  2/10     Assessment  Assessment: Patient reports R shoulder pain is a /10 this afternoon. Patient notes she had another cardiac episode and they found out it is from her Flexeril and Trazidone interacting. Patient notes they discontinued Flexeril and are monitoring. Completed a few strengthening exercises followed by HP prior to PROM. R shoulder PROM: flex = 147 deg, ABD = 128 deg, and ER @90 = 44 deg. Educated patient on ER stretch with cane or at doorframe for HEP.   Chart Reviewed: Yes    Plan  Plan: Continue with current plan    Exercises/Modalities/Manual:  See DocFlow Sheet    Education:     Goals  (Total # of Visits to Date: 16)   Short Term Goals - Time Frame for Short term goals: 10 visits  Short term goal 1: Educate on home program of shoulder/scapular ROM as well as distal extremity ROM/strengthening - MET  Short term goal 2: PROM shoulder flex 100 deg - MET  Short term goal 3: PROM shoulder abduction to 100 deg - MET  Short term goal 4: PROM ER 50 deg - MET    Long Term Goals - Time Frame for Long term goals : 20 visits  Long term goal 1: Upgrade home program for shoulder and scapular strengthening  Long term goal 2: PROM flexion and abduction > 150 deg  Long term goal 3: PROM ER > 65 deg @ 90 deg abd  Long term goal 4: Functional active mobility to reach back /base of head for hair care and IR to touch mid back/don coat  Long term goal 5: Strength right UE to carry gallon of mild at waist height    Post Treatment Pain:  2-3/10    Time In: 1250    Time Out : 1330   Timed Code Treatment Minutes: 40 Minutes  Total Treatment Time: 36 Minutes    Phoenix Bah     Date: 12/7/2021

## 2021-12-10 ENCOUNTER — HOSPITAL ENCOUNTER (OUTPATIENT)
Dept: PHYSICAL THERAPY | Age: 51
Setting detail: THERAPIES SERIES
Discharge: HOME | End: 2021-12-10
Payer: COMMERCIAL

## 2021-12-10 PROCEDURE — 97110 THERAPEUTIC EXERCISES: CPT

## 2021-12-10 NOTE — PROGRESS NOTES
Phone: [address]      Fax: [phone]                            Outpatient Physical Therapy                                                                            Daily Note    Date: 12/10/2021  Patient Name: Crys Sandy        MRN: [id number]   ACCT#:  [de-identified]  : 1970  (54 y.o.)    Referring Practitioner: Dr. Margarita Moreland    Referral Date : 10/04/21    Diagnosis: Right shoulder debridement  Treatment Diagnosis: Right shoulder surgery    Onset Date: 10/04/21  Total # of Visits Approved: 24 Per Physician Order  Total # of Visits to Date: 18  No Show: 0  Canceled Appointment: 1  Plan of Care/Certification Expiration Date: 21    Pre-Treatment Pain:  2/10     Assessment  Assessment: Emphasis on PROM/stretching and jt mobs of right shoulder following HP x 10 minutes to prepare /relax tissues. PROM flexion 148 deg;   ER 45 deg; abduction 150 deg in scapular plane. Increased discomfort with prone anterior mobs and shld extension stretching.     Chart Reviewed: Yes    Plan  Plan: Continue with current plan    Exercises/Modalities/Manual:  See DocFlow Sheet    Education:           Goals  (Total # of Visits to Date: 25)   Short Term Goals - Time Frame for Short term goals: 10 visits  Short term goal 1: Educate on home program of shoulder/scapular ROM as well as distal extremity ROM/strengthening - MET  Short term goal 2: PROM shoulder flex 100 deg - MET  Short term goal 3: PROM shoulder abduction to 100 deg - MET  Short term goal 4: PROM ER 50 deg - MET       Long Term Goals - Time Frame for Long term goals : 20 visits  Long term goal 1: Upgrade home program for shoulder and scapular strengthening  Long term goal 2: PROM flexion and abduction > 150 deg  Long term goal 3: PROM ER > 65 deg @ 90 deg abd  Long term goal 4: Functional active mobility to reach back /base of head for hair care and IR to touch mid back/don coat  Long term goal 5: Strength right UE to carry gallon of mild at waist height    Post Treatment Pain:  2/10    Time In: 1310    Time Out : 1345        Timed Code Treatment Minutes: 35 Minutes  Total Treatment Time: [facility] , PT     Date: 12/10/2021

## 2021-12-17 ENCOUNTER — HOSPITAL ENCOUNTER (OUTPATIENT)
Dept: PHYSICAL THERAPY | Age: 51
Setting detail: THERAPIES SERIES
Discharge: HOME | End: 2021-12-17
Payer: COMMERCIAL

## 2021-12-17 PROCEDURE — 97110 THERAPEUTIC EXERCISES: CPT

## 2021-12-17 ASSESSMENT — PAIN SCALES - GENERAL: PAINLEVEL_OUTOF10: 2

## 2021-12-17 NOTE — PROGRESS NOTES
Long term goals : 20 visits  Long term goal 1: Upgrade home program for shoulder and scapular strengthening- MET  Long term goal 2: PROM flexion and abduction > 150 deg- NOT MET  Long term goal 3: PROM ER > 65 deg @ 90 deg abd- NOT MET  Long term goal 4: Functional active mobility to reach back /base of head for hair care and IR to touch mid back/don coat- NOT MET  Long term goal 5: Strength right UE to carry gallon of mild at waist height - NOT MET  Post Treatment Pain:  4/10    Time In: 1115    Time Out : 1155        Timed Code Treatment Minutes: 35 Minutes  Total Treatment Time: 40 Minutes    JUAN CORTÉS, PT     Date: 12/17/2021

## 2023-03-01 LAB — GROUP A STREP, PCR: NOT DETECTED

## 2024-02-08 ENCOUNTER — OFFICE VISIT (OUTPATIENT)
Dept: PRIMARY CARE | Facility: CLINIC | Age: 54
End: 2024-02-08
Payer: COMMERCIAL

## 2024-02-08 VITALS
DIASTOLIC BLOOD PRESSURE: 80 MMHG | WEIGHT: 221 LBS | HEIGHT: 60 IN | SYSTOLIC BLOOD PRESSURE: 138 MMHG | HEART RATE: 88 BPM | BODY MASS INDEX: 43.39 KG/M2

## 2024-02-08 DIAGNOSIS — E66.01 CLASS 3 SEVERE OBESITY DUE TO EXCESS CALORIES WITH SERIOUS COMORBIDITY AND BODY MASS INDEX (BMI) OF 40.0 TO 44.9 IN ADULT (MULTI): ICD-10-CM

## 2024-02-08 DIAGNOSIS — E04.1 THYROID NODULE: Primary | ICD-10-CM

## 2024-02-08 DIAGNOSIS — Z98.890 S/P RIGHT ROTATOR CUFF REPAIR: ICD-10-CM

## 2024-02-08 DIAGNOSIS — F51.01 PRIMARY INSOMNIA: ICD-10-CM

## 2024-02-08 DIAGNOSIS — Z12.11 ENCOUNTER FOR SCREENING FOR MALIGNANT NEOPLASM OF COLON: ICD-10-CM

## 2024-02-08 DIAGNOSIS — Z87.442 HISTORY OF KIDNEY STONES: ICD-10-CM

## 2024-02-08 DIAGNOSIS — N95.1 VASOMOTOR SYMPTOMS DUE TO MENOPAUSE: ICD-10-CM

## 2024-02-08 DIAGNOSIS — F11.21 OPIOID DEPENDENCE IN REMISSION (MULTI): ICD-10-CM

## 2024-02-08 DIAGNOSIS — Z00.00 ROUTINE GENERAL MEDICAL EXAMINATION AT A HEALTH CARE FACILITY: ICD-10-CM

## 2024-02-08 DIAGNOSIS — Z12.31 ENCOUNTER FOR SCREENING MAMMOGRAM FOR BREAST CANCER: ICD-10-CM

## 2024-02-08 PROBLEM — E66.813 CLASS 3 SEVERE OBESITY DUE TO EXCESS CALORIES WITH SERIOUS COMORBIDITY AND BODY MASS INDEX (BMI) OF 40.0 TO 44.9 IN ADULT: Status: ACTIVE | Noted: 2024-02-08

## 2024-02-08 PROCEDURE — 3008F BODY MASS INDEX DOCD: CPT | Performed by: STUDENT IN AN ORGANIZED HEALTH CARE EDUCATION/TRAINING PROGRAM

## 2024-02-08 PROCEDURE — 99204 OFFICE O/P NEW MOD 45 MIN: CPT | Performed by: STUDENT IN AN ORGANIZED HEALTH CARE EDUCATION/TRAINING PROGRAM

## 2024-02-08 PROCEDURE — 1036F TOBACCO NON-USER: CPT | Performed by: STUDENT IN AN ORGANIZED HEALTH CARE EDUCATION/TRAINING PROGRAM

## 2024-02-08 RX ORDER — HYDROXYZINE PAMOATE 25 MG/1
25 CAPSULE ORAL NIGHTLY PRN
Qty: 30 CAPSULE | Refills: 3 | Status: SHIPPED | OUTPATIENT
Start: 2024-02-08 | End: 2024-02-19 | Stop reason: SINTOL

## 2024-02-08 RX ORDER — BUPRENORPHINE AND NALOXONE 4; 1 MG/1; MG/1
1 FILM, SOLUBLE BUCCAL; SUBLINGUAL DAILY
COMMUNITY
Start: 2008-06-15

## 2024-02-08 ASSESSMENT — ENCOUNTER SYMPTOMS
EYE PAIN: 0
NAUSEA: 0
BLOOD IN STOOL: 0
DIARRHEA: 0
FEVER: 0
ARTHRALGIAS: 0
PALPITATIONS: 0
ABDOMINAL PAIN: 0
ADENOPATHY: 0
COUGH: 0
NERVOUS/ANXIOUS: 0
CONSTIPATION: 0
DYSURIA: 0
RHINORRHEA: 0
CHILLS: 0
HEADACHES: 0
NUMBNESS: 0
SLEEP DISTURBANCE: 1
WEAKNESS: 0
DIZZINESS: 0
EYE REDNESS: 0
SHORTNESS OF BREATH: 0
DYSPHORIC MOOD: 0
MYALGIAS: 0
FLANK PAIN: 0
VOMITING: 0

## 2024-02-08 ASSESSMENT — PATIENT HEALTH QUESTIONNAIRE - PHQ9
2. FEELING DOWN, DEPRESSED OR HOPELESS: NOT AT ALL
SUM OF ALL RESPONSES TO PHQ9 QUESTIONS 1 AND 2: 0
1. LITTLE INTEREST OR PLEASURE IN DOING THINGS: NOT AT ALL

## 2024-02-08 NOTE — PROGRESS NOTES
Subjective   Patient ID: Dimple Khanna is a 54 y.o. female who presents for establish care. Previous provider Dr. Ochoa. Has thyroid nodule, needs checked on. Discuss insomnia     HPI  Hx sinus tachycardia - managed on metoprolol in the past for 6-8mo, symptoms started after episode of kidney stone and shoulder surgery, was attributed to pain and was able to stop the medication per pt report, asymptomatic now, was previously having palpitations and SOB    Kidney stone - x1 in the past, 1/2022, s/p stent, now good about water intake, was previously drinking [place] Mixon    Thyroid nodule - incidental finding during cardiac workup per pt report, last ultrasound 1/2022, no hx biopsy/fna    R shoulder - s/p rotator cuff repair 10/4/2021 with Dr. 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   HENT:  Negative for congestion and rhinorrhea.    Eyes:  Negative for pain and redness.   Respiratory:  Negative for cough and shortness of breath.    Cardiovascular:  Negative  for chest pain, palpitations and leg swelling.   Gastrointestinal:  Negative for abdominal pain, blood in stool, constipation, diarrhea, nausea and vomiting.   Endocrine: Positive for heat intolerance. Negative for cold intolerance.   Genitourinary:  Negative for dysuria and flank pain.   Musculoskeletal:  Negative for arthralgias and myalgias.   Skin:  Negative for rash.   Neurological:  Negative for dizziness, weakness, numbness and headaches.   Hematological:  Negative for adenopathy.   Psychiatric/Behavioral:  Positive for sleep disturbance. Negative for dysphoric mood. The patient is not nervous/anxious.      Objective   /80   Pulse 88   Ht 1.524 m (5')   Wt 100 kg (221 lb)   BMI 43.16 kg/m²     Physical Exam  Vitals reviewed.   Constitutional:       General: She is not in acute distress.     Appearance: Normal appearance.   HENT:      Head: Normocephalic and atraumatic.   Eyes:      General: No scleral icterus.     Conjunctiva/sclera: Conjunctivae normal.   Neck:      Thyroid: No thyroid mass, thyromegaly or thyroid tenderness.   Cardiovascular:      Rate and Rhythm: Normal rate and regular rhythm.      Heart sounds: No murmur heard.  Pulmonary:      Effort: Pulmonary effort is normal. No respiratory distress.      Breath sounds: Normal breath sounds.   Abdominal:      General: Bowel sounds are normal. There is no distension.      Palpations: Abdomen is soft.      Tenderness: There is no abdominal tenderness. There is no guarding or rebound.   Musculoskeletal:         General: No swelling or deformity.      Cervical back: Normal range of motion and neck supple.   Skin:     General: Skin is warm and dry.      Findings: No rash.   Neurological:      General: No focal deficit present.      Mental Status: She is alert.   Psychiatric:         Mood and Affect: Mood normal.         Behavior: Behavior normal.       Assessment/Plan   Problem List Items Addressed This Visit             ICD-10-CM    Vasomotor  symptoms due to menopause N95.1     Discussed likely perimenopause state (s/p hyst). She is interested in lab confirmation - will check FSH.         Thyroid nodule - Primary E04.1     Due for updated ultrasound. Will follow up with results when available.         Relevant Orders    US thyroid    TSH with reflex to Free T4 if abnormal    Follow Up In Primary Care - Established    S/P right rotator cuff repair Z98.890     With subsequent decreased ROM. Prefers to manage conservatively at this time.         Primary insomnia F51.01     In setting of likely perimenopausal state. Reviewed options. Using shared decision making, we decided to retry hydroxyzine. Medication dosing and side effects reviewed. Return precautions reviewed.          Relevant Medications    hydrOXYzine pamoate (VistariL) 25 mg capsule    Other Relevant Orders    Follow Up In Primary Care - Established    Opioid dependence in remission (CMS/[facility]) F11.21     Continue close follow up with psychiatry as previously scheduled.          History of kidney stones Z87.442     Requiring procedural intervention in 1/2022. Reviewed conservative measures. Will continue to monitor.         Class 3 severe obesity due to excess calories with serious comorbidity and body mass index (BMI) of 40.0 to 44.9 in adult (CMS/[facility]) E66.01, Z68.41     Dietary modifications and recommendation for 150 minutes of moderate-intensity exercise per week reviewed.           Other Visit Diagnoses         Codes    Encounter for screening for malignant neoplasm of colon     Z12.11    Relevant Orders    Referral to Gastroenterology    Follow Up In Primary Care - Established    Encounter for screening mammogram for breast cancer     Z12.31    Relevant Orders    BI mammo bilateral screening tomosynthesis    Follow Up In Primary Care - Established    Routine general medical examination at a health care facility     Z00.00    Relevant Orders    CBC and Auto Differential    Comprehensive  Metabolic Panel    Lipid Panel    Follow Up In Primary Care - Established    FSH        Will update screening labs and will follow up with results. Will also update mammo and colonoscopy. Follow up in 3mo for recheck, sooner if needed.

## 2024-02-09 NOTE — ASSESSMENT & PLAN NOTE
Requiring procedural intervention in 1/2022. Reviewed conservative measures. Will continue to monitor.

## 2024-02-09 NOTE — ASSESSMENT & PLAN NOTE
Discussed likely perimenopause state (s/p hyst). She is interested in lab confirmation - will check FSH.

## 2024-02-09 NOTE — ASSESSMENT & PLAN NOTE
In setting of likely perimenopausal state. Reviewed options. Using shared decision making, we decided to retry hydroxyzine. Medication dosing and side effects reviewed. Return precautions reviewed.

## 2024-02-19 DIAGNOSIS — F51.01 PRIMARY INSOMNIA: Primary | ICD-10-CM

## 2024-02-19 RX ORDER — TRAZODONE HYDROCHLORIDE 50 MG/1
50 TABLET ORAL NIGHTLY PRN
Qty: 30 TABLET | Refills: 3 | Status: SHIPPED | OUTPATIENT
Start: 2024-02-19 | End: 2025-02-18

## 2024-02-22 ENCOUNTER — APPOINTMENT (OUTPATIENT)
Dept: RADIOLOGY | Facility: CLINIC | Age: 54
End: 2024-02-22
Payer: COMMERCIAL

## 2024-02-23 DIAGNOSIS — R12 HEARTBURN: Primary | ICD-10-CM

## 2024-02-23 RX ORDER — OMEPRAZOLE 20 MG/1
20 CAPSULE, DELAYED RELEASE ORAL DAILY
Qty: 30 CAPSULE | Refills: 5 | Status: SHIPPED | OUTPATIENT
Start: 2024-02-23 | End: 2024-08-21

## 2024-02-26 ENCOUNTER — HOSPITAL ENCOUNTER (OUTPATIENT)
Dept: RADIOLOGY | Facility: CLINIC | Age: 54
Discharge: HOME | End: 2024-02-26
Payer: COMMERCIAL

## 2024-02-26 ENCOUNTER — APPOINTMENT (OUTPATIENT)
Dept: RADIOLOGY | Facility: CLINIC | Age: 54
End: 2024-02-26
Payer: COMMERCIAL

## 2024-02-26 DIAGNOSIS — Z80.3 FAMILY HISTORY OF BREAST CANCER: Primary | ICD-10-CM

## 2024-02-26 DIAGNOSIS — E04.1 THYROID NODULE: ICD-10-CM

## 2024-02-26 PROCEDURE — 76536 US EXAM OF HEAD AND NECK: CPT | Performed by: RADIOLOGY

## 2024-02-26 PROCEDURE — 76536 US EXAM OF HEAD AND NECK: CPT

## 2024-02-27 ENCOUNTER — LAB (OUTPATIENT)
Dept: LAB | Facility: LAB | Age: 54
End: 2024-02-27
Payer: COMMERCIAL

## 2024-02-27 ENCOUNTER — HOSPITAL ENCOUNTER (OUTPATIENT)
Dept: RADIOLOGY | Facility: CLINIC | Age: 54
Discharge: HOME | End: 2024-02-27
Payer: COMMERCIAL

## 2024-02-27 DIAGNOSIS — E04.1 THYROID NODULE: ICD-10-CM

## 2024-02-27 DIAGNOSIS — Z00.00 ROUTINE GENERAL MEDICAL EXAMINATION AT A HEALTH CARE FACILITY: ICD-10-CM

## 2024-02-27 DIAGNOSIS — Z80.3 FAMILY HISTORY OF BREAST CANCER: ICD-10-CM

## 2024-02-27 DIAGNOSIS — Z12.31 ENCOUNTER FOR SCREENING MAMMOGRAM FOR BREAST CANCER: ICD-10-CM

## 2024-02-27 DIAGNOSIS — R73.01 ELEVATED FASTING GLUCOSE: ICD-10-CM

## 2024-02-27 LAB
ALBUMIN SERPL BCP-MCNC: 4.2 G/DL (ref 3.4–5)
ALP SERPL-CCNC: 100 U/L (ref 33–110)
ALT SERPL W P-5'-P-CCNC: 32 U/L (ref 7–45)
ANION GAP SERPL CALC-SCNC: 14 MMOL/L (ref 10–20)
AST SERPL W P-5'-P-CCNC: 33 U/L (ref 9–39)
BASOPHILS # BLD AUTO: 0.07 X10*3/UL (ref 0–0.1)
BASOPHILS NFR BLD AUTO: 0.9 %
BILIRUB SERPL-MCNC: 0.3 MG/DL (ref 0–1.2)
BUN SERPL-MCNC: 18 MG/DL (ref 6–23)
CALCIUM SERPL-MCNC: 9.3 MG/DL (ref 8.6–10.3)
CHLORIDE SERPL-SCNC: 104 MMOL/L (ref 98–107)
CHOLEST SERPL-MCNC: 191 MG/DL (ref 0–199)
CHOLESTEROL/HDL RATIO: 3.5
CO2 SERPL-SCNC: 25 MMOL/L (ref 21–32)
CREAT SERPL-MCNC: 0.67 MG/DL (ref 0.5–1.05)
EGFRCR SERPLBLD CKD-EPI 2021: >90 ML/MIN/1.73M*2
EOSINOPHIL # BLD AUTO: 0.4 X10*3/UL (ref 0–0.7)
EOSINOPHIL NFR BLD AUTO: 5 %
ERYTHROCYTE [DISTWIDTH] IN BLOOD BY AUTOMATED COUNT: 13.5 % (ref 11.5–14.5)
GLUCOSE SERPL-MCNC: 106 MG/DL (ref 74–99)
HCT VFR BLD AUTO: 46.1 % (ref 36–46)
HDLC SERPL-MCNC: 54 MG/DL
HGB BLD-MCNC: 14.5 G/DL (ref 12–16)
IMM GRANULOCYTES # BLD AUTO: 0.02 X10*3/UL (ref 0–0.7)
IMM GRANULOCYTES NFR BLD AUTO: 0.2 % (ref 0–0.9)
LDLC SERPL CALC-MCNC: 100 MG/DL
LYMPHOCYTES # BLD AUTO: 2.08 X10*3/UL (ref 1.2–4.8)
LYMPHOCYTES NFR BLD AUTO: 25.8 %
MCH RBC QN AUTO: 27.7 PG (ref 26–34)
MCHC RBC AUTO-ENTMCNC: 31.5 G/DL (ref 32–36)
MCV RBC AUTO: 88 FL (ref 80–100)
MONOCYTES # BLD AUTO: 0.67 X10*3/UL (ref 0.1–1)
MONOCYTES NFR BLD AUTO: 8.3 %
NEUTROPHILS # BLD AUTO: 4.81 X10*3/UL (ref 1.2–7.7)
NEUTROPHILS NFR BLD AUTO: 59.8 %
NON HDL CHOLESTEROL: 137 MG/DL (ref 0–149)
NRBC BLD-RTO: 0 /100 WBCS (ref 0–0)
PLATELET # BLD AUTO: 279 X10*3/UL (ref 150–450)
POTASSIUM SERPL-SCNC: 4.5 MMOL/L (ref 3.5–5.3)
PROT SERPL-MCNC: 7.2 G/DL (ref 6.4–8.2)
RBC # BLD AUTO: 5.24 X10*6/UL (ref 4–5.2)
SODIUM SERPL-SCNC: 138 MMOL/L (ref 136–145)
TRIGL SERPL-MCNC: 186 MG/DL (ref 0–149)
TSH SERPL-ACNC: 1.49 MIU/L (ref 0.44–3.98)
VLDL: 37 MG/DL (ref 0–40)
WBC # BLD AUTO: 8.1 X10*3/UL (ref 4.4–11.3)

## 2024-02-27 PROCEDURE — 36415 COLL VENOUS BLD VENIPUNCTURE: CPT

## 2024-02-27 PROCEDURE — 80053 COMPREHEN METABOLIC PANEL: CPT

## 2024-02-27 PROCEDURE — 83001 ASSAY OF GONADOTROPIN (FSH): CPT

## 2024-02-27 PROCEDURE — 84443 ASSAY THYROID STIM HORMONE: CPT

## 2024-02-27 PROCEDURE — 77067 SCR MAMMO BI INCL CAD: CPT

## 2024-02-27 PROCEDURE — 77067 SCR MAMMO BI INCL CAD: CPT | Performed by: RADIOLOGY

## 2024-02-27 PROCEDURE — 85025 COMPLETE CBC W/AUTO DIFF WBC: CPT

## 2024-02-27 PROCEDURE — 83036 HEMOGLOBIN GLYCOSYLATED A1C: CPT

## 2024-02-27 PROCEDURE — 80061 LIPID PANEL: CPT

## 2024-02-27 PROCEDURE — 77063 BREAST TOMOSYNTHESIS BI: CPT | Performed by: RADIOLOGY

## 2024-02-28 LAB — FSH SERPL-ACNC: 38.3 IU/L

## 2024-02-29 DIAGNOSIS — R73.01 ELEVATED FASTING GLUCOSE: Primary | ICD-10-CM

## 2024-03-01 PROBLEM — Z80.3 FAMILY HISTORY OF BREAST CANCER: Status: ACTIVE | Noted: 2024-03-01

## 2024-03-01 PROBLEM — R73.03 PREDIABETES: Status: ACTIVE | Noted: 2024-03-01

## 2024-03-01 PROBLEM — E78.5 DYSLIPIDEMIA: Status: ACTIVE | Noted: 2024-03-01

## 2024-03-01 LAB
EST. AVERAGE GLUCOSE BLD GHB EST-MCNC: 123 MG/DL
HBA1C MFR BLD: 5.9 %

## 2024-03-11 DIAGNOSIS — K21.9 GASTROESOPHAGEAL REFLUX DISEASE WITHOUT ESOPHAGITIS: Primary | ICD-10-CM

## 2024-03-11 RX ORDER — ONDANSETRON 4 MG/1
4 TABLET, ORALLY DISINTEGRATING ORAL EVERY 8 HOURS PRN
Qty: 20 TABLET | Refills: 3 | Status: SHIPPED | OUTPATIENT
Start: 2024-03-11 | End: 2024-03-18

## 2024-03-14 ENCOUNTER — OFFICE VISIT (OUTPATIENT)
Dept: URGENT CARE | Facility: CLINIC | Age: 54
End: 2024-03-14
Payer: COMMERCIAL

## 2024-03-14 VITALS
DIASTOLIC BLOOD PRESSURE: 67 MMHG | RESPIRATION RATE: 14 BRPM | SYSTOLIC BLOOD PRESSURE: 138 MMHG | TEMPERATURE: 98.3 F | HEART RATE: 85 BPM | OXYGEN SATURATION: 96 %

## 2024-03-14 DIAGNOSIS — J06.9 VIRAL URI WITH COUGH: Primary | ICD-10-CM

## 2024-03-14 DIAGNOSIS — R68.89 FLU-LIKE SYMPTOMS: ICD-10-CM

## 2024-03-14 LAB
POC TRIPLEX FLU A-AG: NORMAL
POC TRIPLEX FLU B-AG: NORMAL
POC TRIPLEX SARSCOV-2 AG: NORMAL

## 2024-03-14 PROCEDURE — 87428 SARSCOV & INF VIR A&B AG IA: CPT

## 2024-03-14 PROCEDURE — 99213 OFFICE O/P EST LOW 20 MIN: CPT

## 2024-03-14 NOTE — PROGRESS NOTES
[facility] URGENT CARE HUSSAIN NOTE:      Name: Dimple Khanna, 54 y.o.    CSN:[id number]   MRN:[id number]    PCP: Alma Bishop,     ALL:  No Known Allergies    History:    Chief Complaint: Fever, Cough, Headache, Generalized Body Aches, and Chills (X 2 DAYS)    Encounter Date: 3/14/2024      HPI: The history was obtained from the patient. Dimple is a 54 y.o. female, who presents with a chief complaint of Fever, Cough, Headache, Generalized Body Aches, and Chills (X 2 DAYS). She states she has taken tylenol, motrin and mucinex with some relief. She endorses some nasal congestion as well. Has had close contacts with influenza. She states her fevers are running in the 100's and has not yet hit 101. States temps lower with fever reducing medications. She denies sore throat, chest pain, sob, abdominal pain.     PMHx:    History reviewed. No pertinent past medical history.           Current Outpatient Medications   Medication Sig Dispense Refill    buprenorphine-naloxone (Suboxone) 4-1 mg per sublingual film Place 1 Film under the tongue once daily.      MAGNESIUM GLUCONATE ORAL Take by mouth once daily at bedtime.      omeprazole (PriLOSEC) 20 mg DR capsule Take 1 capsule (20 mg) by mouth once daily. Do not crush or chew. 30 capsule 5    ondansetron ODT (Zofran-ODT) 4 mg disintegrating tablet Take 1 tablet (4 mg) by mouth every 8 hours if needed for nausea or vomiting for up to 7 days. 20 tablet 3    traZODone (Desyrel) 50 mg tablet Take 1 tablet (50 mg) by mouth as needed at bedtime for sleep. 30 tablet 3     No current facility-administered medications for this visit.         PMSx:    Past Surgical History:   Procedure Laterality Date    APPENDECTOMY       SECTION, LOW TRANSVERSE      ENDOMETRIAL ABLATION      HYSTERECTOMY      KNEE ARTHROPLASTY      x2    LITHOTRIPSY      SHOULDER         Fam Hx:   Family History   Problem Relation Name Age of Onset    Breast cancer Mother       Hypertension Father      Hyperlipidemia Father      Diabetes Father         SOC. Hx:     Social History     Socioeconomic History    Marital status:      Spouse name: Not on file    Number of children: Not on file    Years of education: Not on file    Highest education level: Not on file   Occupational History    Not on file   Tobacco Use    Smoking status: Former     Types: Cigarettes    Smokeless tobacco: Never   Substance and Sexual Activity    Alcohol use: Not on file    Drug use: Not on file    Sexual activity: Not on file   Other Topics Concern    Not on file   Social History Narrative    Not on file     Social Determinants of Health     Financial Resource Strain: Not on file   Food Insecurity: Not on file   Transportation Needs: Not on file   Physical Activity: Not on file   Stress: Not on file   Social Connections: Not on file   Intimate Partner Violence: Not on file   Housing Stability: Not on file         Vitals:    03/14/24 1533   BP: 138/67   Pulse: 85   Resp: 14   Temp: 36.8 °C (98.3 °F)   SpO2: 96%                Physical Exam  Constitutional:       Appearance: Normal appearance.   HENT:      Right Ear: Tympanic membrane normal.      Left Ear: Tympanic membrane normal.      Nose: Congestion present.      Mouth/Throat:      Mouth: Mucous membranes are moist.      Pharynx: Oropharynx is clear.   Eyes:      Conjunctiva/sclera: Conjunctivae normal.      Pupils: Pupils are equal, round, and reactive to light.   Cardiovascular:      Rate and Rhythm: Normal rate and regular rhythm.   Pulmonary:      Effort: Pulmonary effort is normal.      Breath sounds: Normal breath sounds.   Skin:     General: Skin is warm.   Neurological:      General: No focal deficit present.      Mental Status: She is alert and oriented to person, place, and time.   Psychiatric:         Mood and Affect: Mood normal.         Behavior: Behavior normal.       LABORATORY @ RADIOLOGICAL IMAGING (if done):     Results for orders  placed or performed in visit on 03/14/24 (from the past 24 hour(s))   POCT BD Veritor Triplex Ag   Result Value Ref Range    POC Triplex SARS-CoV-2 Ag  Presumptive negative for Triplex SARS-CoV-2 (no antigen detected)     Presumptive negative for Triplex SARS-CoV-2 (no antigen detected)    POC Triplex Flu A-Ag  Presumptive negative for Triplex FLU A (no antigen detected)     Presumptive negative for Triplex FLU A (no antigen detected)    POC Triplex Flu B-Ag  Presumptive negative for Triplex FLU B (no antigen detected)     Presumptive negative for Triplex FLU B (no antigen detected)        COURSE/MEDICAL DECISION MAKING:    Dimple is a 54 y.o., who presents with a working diagnosis of   1. Viral URI with cough    2. Flu-like symptoms      Dimple was seen today for fever, cough, headache, generalized body aches and chills.  Diagnoses and all orders for this visit:  Viral URI with cough (Primary)  Flu-like symptoms  -     POCT BD Veritor Triplex Ag    After my independent evaluation, she appears to have a self-limiting illness likely due to a viral URI.   At this time, there is a no evidence of pneumonia, hypoxia, OM, bacterial sinus infection, bacterial bronchitis, bacteremia, or sepsis.     In my medical opinion, I consider Dimple to be low risk for any serious/life-threatening entity, and deem her stable for discharge. This is based on the information that was available to me at the time of this visit.      As we discussed, she is to return to our office or ER immediately if there is any worsening of her condition, such as increased cough, shortness of breath, persistent fevers, repeated vomiting, dehydration, or if her condition worsens at all.      Eboni Lam PA-C   Advanced Practice Provider  [facility] URGENT CARE

## 2024-03-21 LAB — SCAN RESULT: NORMAL

## 2024-03-22 ENCOUNTER — APPOINTMENT (OUTPATIENT)
Dept: PRIMARY CARE | Facility: CLINIC | Age: 54
End: 2024-03-22
Payer: COMMERCIAL

## 2024-03-22 ENCOUNTER — TELEMEDICINE (OUTPATIENT)
Dept: PRIMARY CARE | Facility: CLINIC | Age: 54
End: 2024-03-22
Payer: COMMERCIAL

## 2024-03-22 DIAGNOSIS — T14.90XA MUSCLE INJURY: Primary | ICD-10-CM

## 2024-03-22 PROCEDURE — 1036F TOBACCO NON-USER: CPT | Performed by: FAMILY MEDICINE

## 2024-03-22 PROCEDURE — 99213 OFFICE O/P EST LOW 20 MIN: CPT | Performed by: FAMILY MEDICINE

## 2024-03-22 PROCEDURE — 3008F BODY MASS INDEX DOCD: CPT | Performed by: FAMILY MEDICINE

## 2024-03-22 NOTE — PATIENT INSTRUCTIONS
Problem List Items Addressed This Visit    None  Visit Diagnoses         Codes    Muscle injury    -  Primary T14.90XA            Additional Visit Plans:  We discussed supportive care measures today including ibuprofen or Tylenol for any discomfort, application of heat to help the muscle relax.  I would avoid ice as this may make your calf tighter and then produce more pain    Counseled on concerning symptoms that warrant immediate evaluation such as those for DVT or infection.  I do not suspect these for you at this time.  I do feel that with your prolonged muscle cramp you probably experienced some microtears of your muscle and you experience pain on the days when the muscle is tighter than normal, thereby pulling on the sore areas that need time to heal.  If you experience worsening of pain or continued pain with no further improvement 2 weeks out from now then go see your PCP in person for further evaluation    I reviewed your recent blood work from the end of February, no electrolyte abnormalities to produce increased tendency for muscle cramping.  We discussed careful approaches with stretching at night so as to not induce a muscle cramp in that area.    You feel reassured with today's information    Follow up  If continuing to experience symptoms despite the treatments I have prescribed to you today, please follow up with a primary care provider as needed.   To connect with a new PCP please visit [email or web address] or call [phone]    If experiencing any severe or worsening symptoms including but not limited to lethargy / chest pain / weakness / dizziness / difficulty breathing please call 911 or go to the emergency department for immediate care!    Thank you for allowing me to participate in your care needs today! I hope you feel better soon.         Bradley Chung DO   03/22/24   8:39 AM   [facility] Care Provider

## 2024-03-22 NOTE — PROGRESS NOTES
[facility] Visit Note    Chief Complaint   Patient presents with    Leg Pain       With patient's permission, this is a Telemedicine visit with video and audio to provide on demand virtual care through [facility] in the acute care setting. The provider and patient participated in this telemedicine encounter.    I performed this visit using realtime telehealth tools, including an audio/video OR telephone connection between the patient listed who was located in the [place] and myself, Dr. Bradley Chung (Board certified in the [facility]). At the start of the visit, I introduced myself as Dr. Chung and verified the patients name, , and current physical location.  If they were currently outside of the state of [place], the visit was ended and the patient was referred to alternative means for evaluation and treatment. The patient was made aware of the limitations of the telehealth visit.  They will not be physically examined and all issues may not be appropriate for a telehealth visit.  If necessary, an in person referral will be made.         ID verification performed: Yes    HPI:  Dimple Khanna is a 54 y.o. female contacting  [facility] for concerns regarding leg pain    Current PCP: Dr. Bishop    Virtual message from patient upon registration: Calf pain that started 3/15/2024.  Intermittent, comes and goes every few minutes.  It did not hurt yesterday but it came back today.  Has a popping sensation in her calf.  Sometimes this radiates into her foot.  Had a 30-minute charley horse in the same area last week that lasted 30 minutes.    Per chart review she was seen at an urgent care a week ago for URI with fever.  Testing was negative for flu or coronavirus.  Diagnosed with a viral infection and supportive care measures were recommended.    Today she states that the night  after going to the  she woke up at 3am with a bad maria ines horse. Has had this  before which usually resolves quickly. This one was for 30 minutes with a tight muscle cramp. Now she gets pain in the area on and off - an achy bruised feeling. There is no pain when the popping occurs - feels like a bubble - this has been going on for months before the pain. No palpable lump. Popping in unchanged from the maria ines horse.     She is not on any birth control.  Is a former smoker.  No personal history of cancer, no family history or personal history of blood clots.  No recent prolonged travel or bedrest.  No calf swelling, erythema, prominent veins, fever, shortness of breath or chest pain.    No recent trauma beyond experiencing a significant charley horse/muscle spasm last week.    She does have severe obesity    Well's criteria for DVT: -1 points    Patient Active Problem List    Diagnosis Date Noted    Family history of breast cancer 2024    Dyslipidemia 2024    Prediabetes 2024    Opioid dependence in remission (CMS/[facility]) 2024    History of kidney stones 2024    Thyroid nodule 2024    S/P right rotator cuff repair 2024    Primary insomnia 2024    Vasomotor symptoms due to menopause 2024    Class 3 severe obesity due to excess calories with serious comorbidity and body mass index (BMI) of 40.0 to 44.9 in adult (CMS/[facility]) 2024        History reviewed. No pertinent past medical history.     Current Medications  Current Outpatient Medications   Medication Instructions    buprenorphine-naloxone (Suboxone) 4-1 mg per sublingual film 1 Film, sublingual, Daily    MAGNESIUM GLUCONATE ORAL oral, Nightly    omeprazole (PRILOSEC) 20 mg, oral, Daily, Do not crush or chew.    traZODone (DESYREL) 50 mg, oral, Nightly PRN        Allergies  No Known Allergies     Past Surgical History:   Procedure Laterality Date    APPENDECTOMY       SECTION, LOW TRANSVERSE      ENDOMETRIAL ABLATION      HYSTERECTOMY      KNEE ARTHROPLASTY      x2    LITHOTRIPSY       SHOULDER       Family History   Problem Relation Name Age of Onset    Breast cancer Mother      Hypertension Father      Hyperlipidemia Father      Diabetes Father       Social History     Tobacco Use    Smoking status: Former     Types: Cigarettes    Smokeless tobacco: Never     Tobacco Use: Medium Risk (3/22/2024)    Patient History     Smoking Tobacco Use: Former     Smokeless Tobacco Use: Never     Passive Exposure: Not on file        ROS  All pertinent positive symptoms are included in the history of present illness.  All other systems have been reviewed and are negative and noncontributory to this patient's current ailments.    VITAL SIGNS  There were no vitals filed for this visit.  There were no vitals filed for this visit.   There is no height or weight on file to calculate BMI.   Patient is unable to provide    PHYSICAL EXAM  GENERAL APPEARANCE:  Alert and oriented x 3, Pleasant and cooperative, No acute distress.   LUNGS:  No conversational dyspnea or cough during encounter. No increased work of breathing.   PSYCH:  appropriate mood and affect, no difficulty with speech.   Telemedicine visit, no other exam component done.    Counseling:       Medication education:           Education:  The patient is counseled regarding potential side-effects of all new medications          Understanding:  Patient expressed understanding of information conveyed today          Adherence:  No barriers to adherence identified      Assessment/Plan   Problem List Items Addressed This Visit    None  Visit Diagnoses         Codes    Muscle injury    -  Primary T14.90XA            Additional Visit Plans:  We discussed supportive care measures today including ibuprofen or Tylenol for any discomfort, application of heat to help the muscle relax.  I would avoid ice as this may make your calf tighter and then produce more pain    Counseled on concerning symptoms that warrant immediate evaluation such as those for DVT or infection.   I do not suspect these for you at this time.  I do feel that with your prolonged muscle cramp you probably experienced some microtears of your muscle and you experience pain on the days when the muscle is tighter than normal, thereby pulling on the sore areas that need time to heal.  If you experience worsening of pain or continued pain with no further improvement 2 weeks out from now then go see your PCP in person for further evaluation    I reviewed your recent blood work from the end of February, no electrolyte abnormalities to produce increased tendency for muscle cramping.  We discussed careful approaches with stretching at night so as to not induce a muscle cramp in that area.    You feel reassured with today's information    Follow up  If continuing to experience symptoms despite the treatments I have prescribed to you today, please follow up with a primary care provider as needed.   To connect with a new PCP please visit [email or web address] or call [phone]    If experiencing any severe or worsening symptoms including but not limited to lethargy / chest pain / weakness / dizziness / difficulty breathing please call 911 or go to the emergency department for immediate care!    Thank you for allowing me to participate in your care needs today! I hope you feel better soon.         Bradley Chung DO   03/22/24   8:39 AM   [facility] Care Provider

## 2024-04-01 ENCOUNTER — OFFICE VISIT (OUTPATIENT)
Dept: PRIMARY CARE | Facility: CLINIC | Age: 54
End: 2024-04-01
Payer: COMMERCIAL

## 2024-04-01 VITALS
BODY MASS INDEX: 41.62 KG/M2 | WEIGHT: 212 LBS | HEIGHT: 60 IN | HEART RATE: 84 BPM | DIASTOLIC BLOOD PRESSURE: 80 MMHG | SYSTOLIC BLOOD PRESSURE: 122 MMHG

## 2024-04-01 DIAGNOSIS — M79.662 PAIN OF LEFT CALF: Primary | ICD-10-CM

## 2024-04-01 PROCEDURE — 99213 OFFICE O/P EST LOW 20 MIN: CPT | Performed by: STUDENT IN AN ORGANIZED HEALTH CARE EDUCATION/TRAINING PROGRAM

## 2024-04-01 PROCEDURE — 3008F BODY MASS INDEX DOCD: CPT | Performed by: STUDENT IN AN ORGANIZED HEALTH CARE EDUCATION/TRAINING PROGRAM

## 2024-04-01 PROCEDURE — 1036F TOBACCO NON-USER: CPT | Performed by: STUDENT IN AN ORGANIZED HEALTH CARE EDUCATION/TRAINING PROGRAM

## 2024-04-01 RX ORDER — CYCLOBENZAPRINE HCL 5 MG
5 TABLET ORAL NIGHTLY PRN
Qty: 30 TABLET | Refills: 0 | Status: SHIPPED | OUTPATIENT
Start: 2024-04-01 | End: 2024-05-31

## 2024-04-01 ASSESSMENT — ENCOUNTER SYMPTOMS
NERVOUS/ANXIOUS: 0
FEVER: 0
COUGH: 0
PALPITATIONS: 0
CHILLS: 0
SHORTNESS OF BREATH: 0
DYSPHORIC MOOD: 0

## 2024-04-01 NOTE — PROGRESS NOTES
Subjective   Patient ID: Dimple Khanna is a 54 y.o. female who presents for left calf pain on and off for about 3 weeks. Went to ER Friday when calf pain radiated up. Nothing seen on ultrasound    HPI  Episode started 3wks ago after a 30min charley horse in the LLE overnight. Has had intermittent pain in the L calf since that time. Denies redness, swelling, CP, SOB. There is no personal or fhx VTE (aside from an aunt). Discussed with telemed doc 3/22/2024 and was told that she is very low risk for DVT. Went to [facility] ED for eval due to persistent pain 3/29/2024 (pain was also moving up the extremity a little but has since returned to calf). Had limited ultrasound done by ED physician which was negative by his report. Currently denies pain and is otherwise asymptomatic. She was provided with rx for Flexeril in ED which has helped. Has also tried Aleve which helps a little as well as heat. She denies recent travel, recent surgery, recent trauma. She does sit at a desk for work.    Review of Systems   Constitutional:  Negative for chills and fever.   Respiratory:  Negative for cough and shortness of breath.    Cardiovascular:  Negative for chest pain and palpitations.   Musculoskeletal:         L calf pain   Skin:  Negative for rash.   Psychiatric/Behavioral:  Negative for dysphoric mood. The patient is not nervous/anxious.      Objective   /80   Pulse 84   Ht 1.524 m (5')   Wt 96.2 kg (212 lb)   BMI 41.40 kg/m²     Physical Exam  Constitutional:       Appearance: Normal appearance.   HENT:      Head: Normocephalic.   Eyes:      General: No scleral icterus.     Conjunctiva/sclera: Conjunctivae normal.   Pulmonary:      Effort: Pulmonary effort is normal. No respiratory distress.   Musculoskeletal:         General: Normal range of motion.      Comments: LLE: no calf ttp, nonerythematous, nonedematous, fROM at all joints   Skin:     Findings: No rash.   Neurological:      Mental Status: She  is alert.   Psychiatric:         Mood and Affect: Mood normal.         Behavior: Behavior normal.       Assessment/Plan   Problem List Items Addressed This Visit             ICD-10-CM    Pain of left calf - Primary M79.662     X3wks after charley horse, intermittent, gradually improving. Reviewed telemedicine and ED evals. Exam today is negative for DVT. We discussed proceeding with formal vascular study but ultimately decided to continue to observe for now given improving with time. If not resolved or significantly improved by end of the week, will plan to proceed with dvt study. Will plan for sooner if symptoms progress. Return precautions reviewed. Will refill Flexeril as it has provided relief.         Relevant Medications    cyclobenzaprine (Flexeril) 5 mg tablet

## 2024-04-01 NOTE — ASSESSMENT & PLAN NOTE
X3wks after charley horse, intermittent, gradually improving. Reviewed telemedicine and ED evals. Exam today is negative for DVT. We discussed proceeding with formal vascular study but ultimately decided to continue to observe for now given improving with time. If not resolved or significantly improved by end of the week, will plan to proceed with dvt study. Will plan for sooner if symptoms progress. Return precautions reviewed. Will refill Flexeril as it has provided relief.

## 2024-05-08 ENCOUNTER — APPOINTMENT (OUTPATIENT)
Dept: PRIMARY CARE | Facility: CLINIC | Age: 54
End: 2024-05-08
Payer: COMMERCIAL

## 2024-05-14 ENCOUNTER — OFFICE VISIT (OUTPATIENT)
Dept: PRIMARY CARE | Facility: CLINIC | Age: 54
End: 2024-05-14
Payer: COMMERCIAL

## 2024-05-14 VITALS
BODY MASS INDEX: 41.31 KG/M2 | WEIGHT: 210.4 LBS | DIASTOLIC BLOOD PRESSURE: 84 MMHG | HEART RATE: 84 BPM | HEIGHT: 60 IN | SYSTOLIC BLOOD PRESSURE: 122 MMHG

## 2024-05-14 DIAGNOSIS — R51.9 NONINTRACTABLE HEADACHE, UNSPECIFIED CHRONICITY PATTERN, UNSPECIFIED HEADACHE TYPE: Primary | ICD-10-CM

## 2024-05-14 DIAGNOSIS — K21.9 GASTROESOPHAGEAL REFLUX DISEASE WITHOUT ESOPHAGITIS: ICD-10-CM

## 2024-05-14 PROBLEM — M79.662 PAIN OF LEFT CALF: Status: RESOLVED | Noted: 2024-04-01 | Resolved: 2024-05-14

## 2024-05-14 PROCEDURE — 1036F TOBACCO NON-USER: CPT | Performed by: STUDENT IN AN ORGANIZED HEALTH CARE EDUCATION/TRAINING PROGRAM

## 2024-05-14 PROCEDURE — 3008F BODY MASS INDEX DOCD: CPT | Performed by: STUDENT IN AN ORGANIZED HEALTH CARE EDUCATION/TRAINING PROGRAM

## 2024-05-14 PROCEDURE — 99213 OFFICE O/P EST LOW 20 MIN: CPT | Performed by: STUDENT IN AN ORGANIZED HEALTH CARE EDUCATION/TRAINING PROGRAM

## 2024-05-14 RX ORDER — TOPIRAMATE 50 MG/1
100 TABLET, FILM COATED ORAL NIGHTLY
Qty: 180 TABLET | Refills: 1 | Status: SHIPPED | OUTPATIENT
Start: 2024-05-14 | End: 2024-11-10

## 2024-05-14 ASSESSMENT — ENCOUNTER SYMPTOMS
SHORTNESS OF BREATH: 0
NERVOUS/ANXIOUS: 0
FEVER: 0
COUGH: 0
CHILLS: 0
DYSPHORIC MOOD: 0
PALPITATIONS: 0
HEADACHES: 1

## 2024-05-14 NOTE — PROGRESS NOTES
Subjective   Patient ID: Dimple Khanna is a 54 y.o. female who presents for 3 month check    HPI  GERD - epigastric pain and heartburn much improved with PPI and dietary modification, she takes the PPI daily    Headaches - has had recurrence of her chronic headaches in the recent past, she followed with neurology in the past, states was diagnosed with tension headaches and was managed on Topamax 100mg at bedtime for >10yrs, was stopped after a kidney stone in 1/2022 but states was drinking a lot of tea at that time, she is now drinking 1 coffee in the morning and water the rest of the day    Cervical Cancer Screening: s/p hyst due to AUB, cotest neg 5/20/2022 with obgyn  Breast Cancer Screening: due 2/2025  Osteoporosis Screening: plan to start at age 65  Colon Cancer Screening: due, she plans to schedule  Tobacco: remote rare use  Alcohol: rare  Recreational Drugs: denies  Immunizations: due for influenza, TDaP, Shingrix - she will consider    Review of Systems   Constitutional:  Negative for chills and fever.   Respiratory:  Negative for cough and shortness of breath.    Cardiovascular:  Negative for chest pain and palpitations.   Skin:  Negative for rash.   Neurological:  Positive for headaches.   Psychiatric/Behavioral:  Negative for dysphoric mood. The patient is not nervous/anxious.      Objective   /84   Pulse 84   Ht 1.524 m (5')   Wt 95.4 kg (210 lb 6.4 oz)   BMI 41.09 kg/m²     Physical Exam  Constitutional:       Appearance: Normal appearance.   HENT:      Head: Normocephalic and atraumatic.   Eyes:      General: No scleral icterus.     Conjunctiva/sclera: Conjunctivae normal.   Cardiovascular:      Rate and Rhythm: Normal rate and regular rhythm.      Heart sounds: No murmur heard.  Pulmonary:      Effort: Pulmonary effort is normal. No respiratory distress.      Breath sounds: Normal breath sounds.   Musculoskeletal:         General: Normal range of motion.      Cervical back: Normal  range of motion and neck supple.   Skin:     General: Skin is warm and dry.      Findings: No rash.   Neurological:      General: No focal deficit present.      Mental Status: She is alert.   Psychiatric:         Mood and Affect: Mood normal.         Behavior: Behavior normal.       Assessment/Plan   Problem List Items Addressed This Visit             ICD-10-CM    Nonintractable headache - Primary R51.9     Previously managed on Topamax and did well with it until kidney stone in 1/2022 but was also drinking quite a bit of tea. She is interested in restarting Topamax. Medication dosing and side effects reviewed. 25mg at bedtime x1wk, then 50mg at bedtime x1wk, then 75mg at bedtime x1wk, then 100mg at bedtime. Return precautions reviewed.          Relevant Medications    topiramate (Topamax) 50 mg tablet    Gastroesophageal reflux disease without esophagitis K21.9     Much improved with dietary modification and PPI. Will continue. Return precautions reviewed.         Screenings are utd aside from colon ca screening. GI number provided. Follow up in 3mo for recheck, sooner if needed.

## 2024-05-14 NOTE — ASSESSMENT & PLAN NOTE
Previously managed on Topamax and did well with it until kidney stone in 1/2022 but was also drinking quite a bit of tea. She is interested in restarting Topamax. Medication dosing and side effects reviewed. 25mg at bedtime x1wk, then 50mg at bedtime x1wk, then 75mg at bedtime x1wk, then 100mg at bedtime. Return precautions reviewed.

## 2024-06-17 DIAGNOSIS — F51.01 PRIMARY INSOMNIA: ICD-10-CM

## 2024-06-17 RX ORDER — TRAZODONE HYDROCHLORIDE 50 MG/1
50 TABLET ORAL NIGHTLY PRN
Qty: 90 TABLET | Refills: 3 | Status: SHIPPED | OUTPATIENT
Start: 2024-06-17 | End: 2025-06-17

## 2024-07-03 DIAGNOSIS — F51.01 PRIMARY INSOMNIA: Primary | ICD-10-CM

## 2024-07-03 RX ORDER — HYDROXYZINE PAMOATE 25 MG/1
25 CAPSULE ORAL NIGHTLY PRN
Qty: 90 CAPSULE | Refills: 1 | Status: SHIPPED | OUTPATIENT
Start: 2024-07-03 | End: 2025-07-03

## 2024-07-17 DIAGNOSIS — R06.83 SNORING: Primary | ICD-10-CM

## 2024-07-17 DIAGNOSIS — R40.0 DAYTIME SOMNOLENCE: ICD-10-CM

## 2024-07-18 NOTE — PROGRESS NOTES
NO PRECERT NEEDED. ROUTED TO [facility] AT [facility] SLEEP LAB TO SCHEDULE WITH PATIENT.

## 2024-07-19 ENCOUNTER — CLINICAL SUPPORT (OUTPATIENT)
Dept: SLEEP MEDICINE | Facility: HOSPITAL | Age: 54
End: 2024-07-19
Payer: COMMERCIAL

## 2024-07-19 VITALS — HEIGHT: 60 IN | BODY MASS INDEX: 41.23 KG/M2 | WEIGHT: 210 LBS

## 2024-07-19 DIAGNOSIS — R40.0 DAYTIME SOMNOLENCE: ICD-10-CM

## 2024-07-19 DIAGNOSIS — R06.83 SNORING: ICD-10-CM

## 2024-07-19 PROCEDURE — 9420000001 HC RT PATIENT EDUCATION 5 MIN

## 2024-07-19 NOTE — PROGRESS NOTES
[facility] TECH NOTE:     Patient: Dimple Khanna   MRN//AGE: [id number]  1970  54 y.o.   Technologist: Simona Arrieta RRT-SDS   Room: Home Sleep Study_Nomad   Service Date: 2024        Sleep Testing Location: [place] [address]      [place]: 5    TECHNOLOGIST SLEEP STUDY PROCEDURE NOTE:   This sleep study is being conducted according to the policies and procedures outlined by the AAS accreditation standards.  The sleep study procedure and processes involved during this appointment was explained to the patient/patient’s family, questions were answered. The patient/family verbalized understanding.      The patient is a 54 y.o. year old female scheduled for a Home Sleep Apnea Test.    The full study Was completed.  Patient questionnaires completed?: yes     Consents signed? yes    Initial Fall Risk Screening:     Dimple has not fallen in the last 6 months. She did not result in injury. Dimple does not have a fear of falling. She does not need assistance with sitting, standing, or walking.  She does not need assistance walking in her home. She does not need assistance in an unfamiliar setting. The patient is not using an assistive device.     Brief Study observations:     Deviation to order/protocol and reason:      If PAP, which was preferred mask/pressure/mode:     Other:The patient received equipment and instructions for use of the [employer] Nomad HSAT device. The patient was instructed how to apply the effort belts, cannula, thermistor. It was also explained how the Nomad and oximeter components work.       The patient was informed of their responsibility for the device and acknowledged this by signing the HSAT device contract. The patient was asked to return the device on the next day and was shown where the drop off box was located.     After the procedure, the patient/family was informed to ensure followup with ordering clinician for testing results.       Technologist: Simona Arrieta, RRT-SDS

## 2024-07-22 ENCOUNTER — APPOINTMENT (OUTPATIENT)
Dept: SLEEP MEDICINE | Facility: HOSPITAL | Age: 54
End: 2024-07-22
Payer: COMMERCIAL

## 2024-07-29 ENCOUNTER — OFFICE VISIT (OUTPATIENT)
Dept: URGENT CARE | Facility: CLINIC | Age: 54
End: 2024-07-29
Payer: COMMERCIAL

## 2024-07-29 VITALS
DIASTOLIC BLOOD PRESSURE: 85 MMHG | RESPIRATION RATE: 14 BRPM | HEART RATE: 82 BPM | SYSTOLIC BLOOD PRESSURE: 124 MMHG | TEMPERATURE: 97.5 F | OXYGEN SATURATION: 96 %

## 2024-07-29 DIAGNOSIS — J34.89 SINUS PAIN: ICD-10-CM

## 2024-07-29 DIAGNOSIS — J02.9 SORE THROAT: ICD-10-CM

## 2024-07-29 DIAGNOSIS — R05.1 ACUTE COUGH: ICD-10-CM

## 2024-07-29 DIAGNOSIS — R09.81 NASAL CONGESTION: ICD-10-CM

## 2024-07-29 DIAGNOSIS — H66.90 ACUTE OTITIS MEDIA, UNSPECIFIED OTITIS MEDIA TYPE: Primary | ICD-10-CM

## 2024-07-29 LAB — POC GROUP A STREP, PCR: NOT DETECTED

## 2024-07-29 PROCEDURE — 87651 STREP A DNA AMP PROBE: CPT | Mod: QW

## 2024-07-29 PROCEDURE — 99213 OFFICE O/P EST LOW 20 MIN: CPT

## 2024-07-29 RX ORDER — AZITHROMYCIN 250 MG/1
TABLET, FILM COATED ORAL
Qty: 6 TABLET | Refills: 0 | Status: SHIPPED | OUTPATIENT
Start: 2024-07-29 | End: 2024-08-03

## 2024-07-29 NOTE — PROGRESS NOTES
[facility] URGENT CARE HUSSAIN NOTE:      Name: Dimple Khanna, 54 y.o.    CSN:[id number]   MRN:[id number]    PCP: Alma Bishop,     ALL:  No Known Allergies    History:    Chief Complaint: Sore Throat, Headache, and trouble  breathing ( 4 days)    Encounter Date: 7/29/2024      HPI: The history was obtained from the patient. Dimple is a 54 y.o. female, who presents with a chief complaint of Sore Throat, Headache, and trouble  breathing ( 4 days).  Patient states she feels like she is getting worse over her illness course.  She endorses a sore throat, dry cough, nasal congestion, sinus pain and pressure.  She also states she feels like she has slight trouble getting a deep breath.  She denies a history of asthma or COPD.  Patient states she has had a low-grade fever of 100 which responds to antipyretics.  She has not taken any fever reducing medications today.  She is afebrile in clinic with a temp of 97.5.  She also endorses chills.  She denies chest pain, shortness of breath, abdominal pain, diarrhea.    I did offer patient COVID and strep testing today.  She would like to be strep tested but declines COVID.  I also offered patient a nebulizer breathing treatment in clinic today.  Patient also declined.  PMHx:    History reviewed. No pertinent past medical history.           Current Outpatient Medications   Medication Sig Dispense Refill    buprenorphine-naloxone (Suboxone) 4-1 mg per sublingual film Place 1 Film under the tongue once daily.      omeprazole (PriLOSEC) 20 mg DR capsule Take 1 capsule (20 mg) by mouth once daily. Do not crush or chew. 30 capsule 5    traZODone (Desyrel) 50 mg tablet Take 1 tablet (50 mg) by mouth as needed at bedtime for sleep. 90 tablet 3    azithromycin (Zithromax Z-Edil) 250 mg tablet Take 2 tablets (500 mg) on  Day 1,  followed by 1 tablet (250 mg) once daily on Days 2 through 5. 6 tablet 0    hydrOXYzine pamoate (VistariL) 25 mg capsule Take 1  capsule (25 mg) by mouth as needed at bedtime for anxiety. (Patient not taking: Reported on 2024) 90 capsule 1    MAGNESIUM GLUCONATE ORAL Take by mouth once daily at bedtime.       No current facility-administered medications for this visit.         PMSx:    Past Surgical History:   Procedure Laterality Date    APPENDECTOMY       SECTION, LOW TRANSVERSE      ENDOMETRIAL ABLATION      HYSTERECTOMY      KNEE ARTHROPLASTY      x2    LITHOTRIPSY      SHOULDER         Fam Hx:   Family History   Problem Relation Name Age of Onset    Breast cancer Mother      Hypertension Father      Hyperlipidemia Father      Diabetes Father         SOC. Hx:     Social History     Socioeconomic History    Marital status:      Spouse name: Not on file    Number of children: Not on file    Years of education: Not on file    Highest education level: Not on file   Occupational History    Not on file   Tobacco Use    Smoking status: Former     Types: Cigarettes    Smokeless tobacco: Never   Vaping Use    Vaping status: Never Used   Substance and Sexual Activity    Alcohol use: Not Currently    Drug use: Not Currently    Sexual activity: Not on file   Other Topics Concern    Not on file   Social History Narrative    Not on file     Social Determinants of Health     Financial Resource Strain: Not on file   Food Insecurity: Not on file   Transportation Needs: Not on file   Physical Activity: Not on file   Stress: Not on file   Social Connections: Not on file   Intimate Partner Violence: Not on file   Housing Stability: Not on file         Vitals:    24 1055   BP: 124/85   Pulse: 82   Resp: 14   Temp: 36.4 °C (97.5 °F)   SpO2: 96%                Physical Exam  Vitals reviewed.   Constitutional:       General: She is not in acute distress.     Appearance: Normal appearance. She is not ill-appearing.   HENT:      Right Ear: Tympanic membrane, ear canal and external ear normal.      Left Ear: Tympanic membrane is injected,  erythematous and bulging.      Nose: Congestion present.      Right Sinus: Maxillary sinus tenderness and frontal sinus tenderness present.      Left Sinus: Maxillary sinus tenderness and frontal sinus tenderness present.      Mouth/Throat:      Mouth: Mucous membranes are moist.      Pharynx: Oropharynx is clear. Posterior oropharyngeal erythema present.   Eyes:      Extraocular Movements: Extraocular movements intact.      Conjunctiva/sclera: Conjunctivae normal.   Cardiovascular:      Rate and Rhythm: Normal rate and regular rhythm.      Pulses: Normal pulses.      Heart sounds: Normal heart sounds.   Pulmonary:      Effort: Pulmonary effort is normal. No respiratory distress.      Breath sounds: Normal breath sounds. No stridor. No wheezing, rhonchi or rales.   Abdominal:      General: Abdomen is flat.      Palpations: Abdomen is soft.   Skin:     General: Skin is warm.   Neurological:      General: No focal deficit present.      Mental Status: She is alert and oriented to person, place, and time.   Psychiatric:         Mood and Affect: Mood normal.         Behavior: Behavior normal.         I did personally review Dimple's past medical history, surgical history, social history, as well as family history (when relevant).  In this case, I also oversaw the her drug management by reviewing her medication list, allergy list, as well as the medications that I prescribed during the UC course and/or recommended as an out-patient (including possible OTC medications such as acetaminophen, NSAIDs , etc).    After reviewing the items above, I did look at previous medical documentation, such as recent hospitalizations, office visits, and/or recent consultations with PCP/specialist.                          SDOH:   Another factor that I considered in Dimple's care was her Social Determinants of Health (SDOH). During this UC encounter, she did not have social determinants of health. Those SDOH influencing Dimple's care are:  financial resource strain / unemployment and none    LABORATORY @ RADIOLOGICAL IMAGING (if done):     Results for orders placed or performed in visit on 07/29/24 (from the past 24 hour(s))   POCT Group A Streptococcus, PCR manually resulted   Result Value Ref Range    POC Group A Strep, PCR Not Detected Not Detected       UC COURSE/MEDICAL DECISION MAKING:    Dimple is a 54 y.o., who presents with a working diagnosis of   1. Acute otitis media, unspecified otitis media type    2. Acute cough    3. Sore throat    4. Sinus pain    5. Nasal congestion      Dimple was seen today for sore throat, headache and trouble  breathing.  Diagnoses and all orders for this visit:  Acute otitis media, unspecified otitis media type (Primary)  -     azithromycin (Zithromax Z-Edil) 250 mg tablet; Take 2 tablets (500 mg) on  Day 1,  followed by 1 tablet (250 mg) once daily on Days 2 through 5.  Acute cough  Sore throat  -     POCT Group A Streptococcus, PCR manually resulted  Sinus pain  Nasal congestion  Based on my clinical evaluation Dimple has acute otitis media of the L ear. At this time there is no evidence of tympanic membrane perforation or bacterial sinus infection. In my medical opinion, I consider Dimple to be low risk for any serious/life-threatening entity, and deem her stable for discharge.     I instructed her to take the antibiotic as directed. As we discussed she is to return to our office or ER immediately if there is any worsening of her symptoms, such as fever, nausea/vomiting, jaw pain or if her condition worsens at all.      Strep test negative in clinic today.  Sore throat, cough, sinus pressure and nasal congestion likely due to a viral syndrome.  Although if URI symptoms are bacterial in nature Zithromax will cover as well.  Push fluids.  Over-the-counter medications as needed for symptoms.  Again I did offer a DuoNeb to patient in clinic today which she declined.  Also declined COVID test.    As we discussed,  "she is to return to our office or ER immediately if there is any worsening of her condition, such as increased cough, shortness of breath, persistent fevers, repeated vomiting, dehydration, or if her condition worsens at all.    Eboni Lam PA-C   Advanced Practice Provider  [facility]    Please note: Portions of this chart may have been created with Dragon voice recognition software. Occasional wrong-word or \"sound-like\" substitutions may have occurred due to inherent limitations of the voice recognition software. Please excuse any typographical or grammatical errors contained herein. Please read the chart carefully and recognize, using context, where the substitutions have occurred.   "

## 2024-08-14 ENCOUNTER — APPOINTMENT (OUTPATIENT)
Dept: PRIMARY CARE | Facility: CLINIC | Age: 54
End: 2024-08-14
Payer: COMMERCIAL

## 2024-08-14 VITALS
SYSTOLIC BLOOD PRESSURE: 112 MMHG | HEIGHT: 60 IN | BODY MASS INDEX: 40.64 KG/M2 | WEIGHT: 207 LBS | DIASTOLIC BLOOD PRESSURE: 76 MMHG | HEART RATE: 92 BPM

## 2024-08-14 DIAGNOSIS — N94.10 DYSPAREUNIA IN FEMALE: ICD-10-CM

## 2024-08-14 DIAGNOSIS — G47.33 OSA (OBSTRUCTIVE SLEEP APNEA): Primary | ICD-10-CM

## 2024-08-14 PROCEDURE — 99213 OFFICE O/P EST LOW 20 MIN: CPT | Performed by: STUDENT IN AN ORGANIZED HEALTH CARE EDUCATION/TRAINING PROGRAM

## 2024-08-14 PROCEDURE — 1036F TOBACCO NON-USER: CPT | Performed by: STUDENT IN AN ORGANIZED HEALTH CARE EDUCATION/TRAINING PROGRAM

## 2024-08-14 PROCEDURE — 3008F BODY MASS INDEX DOCD: CPT | Performed by: STUDENT IN AN ORGANIZED HEALTH CARE EDUCATION/TRAINING PROGRAM

## 2024-08-14 RX ORDER — ESTRADIOL 0.1 MG/G
2 CREAM VAGINAL DAILY
Qty: 42.5 G | Refills: 5 | Status: SHIPPED | OUTPATIENT
Start: 2024-08-14 | End: 2025-08-14

## 2024-08-14 NOTE — PROGRESS NOTES
Subjective   Patient ID: Dimple Khanna is a 54 y.o. female who presents for 3 month check. Also had sleep study    HPI  DIETER - home sleep study was done due to snoring, daytime somnolence, PND and was consistent with moderate sleep apnea, she is overall not sleeping well    Dyspareunia - notes significant pressure with penetration, gradual onset but seemed to get worse about a month ago, no bleeding or bulge, no urinary symptoms    Review of Systems   Constitutional:  Positive for fatigue. Negative for chills and fever.   Respiratory:  Negative for cough and shortness of breath.    Cardiovascular:  Negative for chest pain and palpitations.   Genitourinary:  Positive for dyspareunia.   Skin:  Negative for rash.   Psychiatric/Behavioral:  Positive for sleep disturbance. Negative for dysphoric mood. The patient is not nervous/anxious.      Objective   /76   Pulse 92   Ht 1.524 m (5')   Wt 93.9 kg (207 lb)   BMI 40.43 kg/m²     Physical Exam  Constitutional:       Appearance: Normal appearance.   HENT:      Head: Normocephalic and atraumatic.   Eyes:      General: No scleral icterus.     Conjunctiva/sclera: Conjunctivae normal.   Cardiovascular:      Rate and Rhythm: Normal rate and regular rhythm.   Pulmonary:      Effort: Pulmonary effort is normal. No respiratory distress.      Breath sounds: Normal breath sounds.   Musculoskeletal:         General: Normal range of motion.      Cervical back: Normal range of motion and neck supple.   Skin:     General: Skin is warm and dry.      Findings: No rash.   Neurological:      General: No focal deficit present.      Mental Status: She is alert.   Psychiatric:         Mood and Affect: Mood normal.         Behavior: Behavior normal.       Assessment/Plan   Problem List Items Addressed This Visit             ICD-10-CM    DIETER (obstructive sleep apnea) - Primary G47.33     We reviewed HST and tx options. She is agreeable to CPAP trial.          Relevant Orders     Positive Airway Pressure (PAP) Therapy    Follow Up In Primary Care - Established    Dyspareunia in female N94.10     We reviewed possible etiologies including menopausal vaginal atrophy. Will trial topical estradiol. Medication dosing and side effects reviewed. Return precautions reviewed. If no better or if worse, will need to do exam.         Relevant Medications    estradiol (Estrace) 0.01 % (0.1 mg/gram) vaginal cream   Follow up in 3mo for recheck, sooner if needed.

## 2024-08-20 PROBLEM — G47.33 OSA (OBSTRUCTIVE SLEEP APNEA): Status: ACTIVE | Noted: 2024-08-20

## 2024-08-20 PROBLEM — N94.10 DYSPAREUNIA IN FEMALE: Status: ACTIVE | Noted: 2024-08-20

## 2024-08-20 ASSESSMENT — ENCOUNTER SYMPTOMS
NERVOUS/ANXIOUS: 0
CHILLS: 0
SHORTNESS OF BREATH: 0
PALPITATIONS: 0
DYSPHORIC MOOD: 0
FATIGUE: 1
SLEEP DISTURBANCE: 1
FEVER: 0
COUGH: 0

## 2024-08-21 NOTE — ASSESSMENT & PLAN NOTE
We reviewed possible etiologies including menopausal vaginal atrophy. Will trial topical estradiol. Medication dosing and side effects reviewed. Return precautions reviewed. If no better or if worse, will need to do exam.

## 2024-09-04 DIAGNOSIS — R12 HEARTBURN: ICD-10-CM

## 2024-09-04 DIAGNOSIS — F51.01 PRIMARY INSOMNIA: ICD-10-CM

## 2024-09-04 RX ORDER — TRAZODONE HYDROCHLORIDE 50 MG/1
50 TABLET ORAL NIGHTLY PRN
Qty: 90 TABLET | Refills: 1 | Status: SHIPPED | OUTPATIENT
Start: 2024-09-04 | End: 2025-09-04

## 2024-09-04 RX ORDER — OMEPRAZOLE 20 MG/1
20 CAPSULE, DELAYED RELEASE ORAL DAILY
Qty: 90 CAPSULE | Refills: 1 | Status: SHIPPED | OUTPATIENT
Start: 2024-09-04 | End: 2025-03-03

## 2024-09-06 ENCOUNTER — OFFICE VISIT (OUTPATIENT)
Dept: URGENT CARE | Facility: CLINIC | Age: 54
End: 2024-09-06
Payer: COMMERCIAL

## 2024-09-06 VITALS
TEMPERATURE: 98.5 F | SYSTOLIC BLOOD PRESSURE: 108 MMHG | RESPIRATION RATE: 17 BRPM | HEART RATE: 87 BPM | OXYGEN SATURATION: 95 % | DIASTOLIC BLOOD PRESSURE: 60 MMHG

## 2024-09-06 DIAGNOSIS — U07.1 SARS-COV-2 POSITIVE: ICD-10-CM

## 2024-09-06 DIAGNOSIS — J01.90 ACUTE RHINOSINUSITIS: Primary | ICD-10-CM

## 2024-09-06 LAB — POC SARS-COV-2 AG: ABNORMAL

## 2024-09-06 PROCEDURE — 99212 OFFICE O/P EST SF 10 MIN: CPT | Performed by: PHYSICIAN ASSISTANT

## 2024-09-06 PROCEDURE — 99213 OFFICE O/P EST LOW 20 MIN: CPT | Performed by: PHYSICIAN ASSISTANT

## 2024-09-06 PROCEDURE — 87426 SARSCOV CORONAVIRUS AG IA: CPT | Performed by: PHYSICIAN ASSISTANT

## 2024-09-06 RX ORDER — DEXTROMETHORPHAN HYDROBROMIDE, GUAIFENESIN AND PSEUDOEPHEDRINE HYDROCHLORIDE 15; 400; 60 MG/1; MG/1; MG/1
1 TABLET ORAL 3 TIMES DAILY
Qty: 21 TABLET | Refills: 0 | Status: SHIPPED | OUTPATIENT
Start: 2024-09-06 | End: 2024-09-13

## 2024-09-06 NOTE — PROGRESS NOTES
"[facility] URGENT CARE HUSSAIN NOTE:      Name: Dimple Khanna, 54 y.o.    CSN:[id number]   MRN:[id number]    PCP: Alma Bishop,     ALL:  No Known Allergies    History:    Chief Complaint: Cough, Sore Throat, Generalized Body Aches, Chills, and Nausea (X 3 days)    Encounter Date: 9/6/2024      HPI: The history was obtained from the patient. Dimple is a 54 y.o. female, who presents with a chief complaint of Cough, Sore Throat, Generalized Body Aches, Chills, and Nausea (X 3 days). Patient states she has been around multiple sick contacts, and would like to be tested for COVID-19. She states she has been feeling \"feverish\" but hasn't taken her tempeture. She denies any shortness of breath.    PMHx:    Past Medical History:   Diagnosis Date    Insomnia     Opiate dependence (Multi)               Current Outpatient Medications   Medication Sig Dispense Refill    buprenorphine-naloxone (Suboxone) 4-1 mg per sublingual film Place 1 Film under the tongue once daily.      hydrOXYzine pamoate (VistariL) 25 mg capsule Take 1 capsule (25 mg) by mouth as needed at bedtime for anxiety. 90 capsule 1    MAGNESIUM GLUCONATE ORAL Take by mouth once daily at bedtime.      omeprazole (PriLOSEC) 20 mg DR capsule Take 1 capsule (20 mg) by mouth once daily. Do not crush or chew. 90 capsule 1    traZODone (Desyrel) 50 mg tablet Take 1 tablet (50 mg) by mouth as needed at bedtime for sleep. 90 tablet 1    estradiol (Estrace) 0.01 % (0.1 mg/gram) vaginal cream Insert 0.5 Applicatorfuls (2 g) into the vagina once daily. Apply to vagina nightly for 1 week then every Monday/Wednesday/Friday. (Patient not taking: Reported on 9/6/2024) 42.5 g 5    pseudoephedrine-DM-guaifenesin (Capmist DM) 60- mg tablet Take 1 tablet by mouth 3 times a day for 7 days. 21 tablet 0     No current facility-administered medications for this visit.         PMSx:    Past Surgical History:   Procedure Laterality Date    " APPENDECTOMY       SECTION, LOW TRANSVERSE      ENDOMETRIAL ABLATION      HYSTERECTOMY      KNEE ARTHROPLASTY      x2    LITHOTRIPSY      SHOULDER         Fam Hx:   Family History   Problem Relation Name Age of Onset    Breast cancer Mother      Hypertension Father      Hyperlipidemia Father      Diabetes Father         SOC. Hx:     Social History     Socioeconomic History    Marital status:      Spouse name: Not on file    Number of children: Not on file    Years of education: Not on file    Highest education level: Not on file   Occupational History    Not on file   Tobacco Use    Smoking status: Former     Types: Cigarettes    Smokeless tobacco: Never   Vaping Use    Vaping status: Never Used   Substance and Sexual Activity    Alcohol use: Not Currently    Drug use: Not Currently    Sexual activity: Not on file   Other Topics Concern    Not on file   Social History Narrative    Not on file     Social Determinants of Health     Financial Resource Strain: Not on file   Food Insecurity: Not on file   Transportation Needs: Not on file   Physical Activity: Not on file   Stress: Not on file   Social Connections: Not on file   Intimate Partner Violence: Not on file   Housing Stability: Not on file         Vitals:    24 1544   BP: 108/60   Pulse: 87   Resp: 17   Temp: 36.9 °C (98.5 °F)   SpO2: 95%                Physical Exam  Constitutional:       General: She is not in acute distress.     Appearance: She is not diaphoretic.   HENT:      Head: Normocephalic.      Right Ear: Hearing, ear canal and external ear normal. Tympanic membrane is bulging.      Left Ear: Hearing, ear canal and external ear normal. Tympanic membrane is not bulging.      Nose: Congestion present.      Right Turbinates: Enlarged and swollen.      Left Turbinates: Enlarged and swollen.      Mouth/Throat:      Tonsils: No tonsillar exudate. 1+ on the right. 1+ on the left.   Cardiovascular:      Rate and Rhythm: Normal rate and  regular rhythm.      Heart sounds: No murmur heard.     No gallop.   Pulmonary:      Effort: Pulmonary effort is normal. No respiratory distress.      Breath sounds: No wheezing or rales.   Lymphadenopathy:      Cervical: Cervical adenopathy present.   Neurological:      Mental Status: She is alert.           LABORATORY @ RADIOLOGICAL IMAGING (if done):     COVID-19 test: Positive.     ____________________________________________________________________    I did personally review Dimple's past medical history, surgical history, social history, as well as family history (when relevant).  In this case, I also oversaw the her drug management by reviewing her medication list, allergy list, as well as the medications that I prescribed during the UC course and/or recommended as an out-patient (including possible OTC medications such as acetaminophen, NSAIDs , etc).    After reviewing the items above, I did look at previous medical documentation, such as recent hospitalizations, office visits, and/or recent consultations with PCP/specialist.                          SDOH:   Another factor that I considered in Dimple's care was her Social Determinants of Health (SDOH). During this UC encounter, she did not have social determinants of health. Those SDOH influencing Dimple's care are: none      UC COURSE/MEDICAL DECISION MAKING:    Dimple is a 54 y.o., who presents with a working diagnosis of   1. Acute rhinosinusitis    2. SARS-CoV-2 positive     with a differential to include:       Plan:   The patient was educated on symptomatic management and alarming signs such as fever over 102. Patient was told she could call or follow up if she has any questions or concerns. For now, rest and hydration along with from cough suppressant is recommended.     I, Jonna Oliva, PA student, helped prepare the medical record for my supervising clinician, Daniel Vale PA-C.   MICHELLE JimenezS, [facility]  [place]    Supervised by  Daniel Richmond PA-C   Advanced Practice Provider  [facility] URGENT [facility]ARE    I was present with the PA student who participated in the documentation of this note. I have personally seen and re-examined the patient and performed the medical decision-making components (assessment and plan of care). I have reviewed the PA student documentation and verified the findings in the note as written with additions or exceptions as stated in the body of this note.    Daniel Richmond PA-C

## 2024-09-06 NOTE — LETTER
September 6, 2024     Patient: Dimple Khanna   YOB: 1970   Date of Visit: 9/6/2024       To Whom It May Concern:    It is my medical opinion that Dimple Khanna should remain out of work until 9/9/2024 .    If you have any questions or concerns, please don't hesitate to call.         Sincerely,        Daniel Richmond PA-C    CC: No Recipients

## 2024-10-01 DIAGNOSIS — K21.9 GASTROESOPHAGEAL REFLUX DISEASE WITHOUT ESOPHAGITIS: Primary | ICD-10-CM

## 2024-10-01 DIAGNOSIS — F51.01 PRIMARY INSOMNIA: ICD-10-CM

## 2024-10-01 RX ORDER — HYDROXYZINE PAMOATE 25 MG/1
25 CAPSULE ORAL NIGHTLY PRN
Qty: 90 CAPSULE | Refills: 1 | Status: SHIPPED | OUTPATIENT
Start: 2024-10-01 | End: 2025-10-01

## 2024-10-01 RX ORDER — HYDROGEN PEROXIDE 3 %
20 SOLUTION, NON-ORAL MISCELLANEOUS
Qty: 90 CAPSULE | Refills: 3 | Status: SHIPPED | OUTPATIENT
Start: 2024-10-01 | End: 2025-10-01

## 2024-11-20 ENCOUNTER — APPOINTMENT (OUTPATIENT)
Dept: PRIMARY CARE | Facility: CLINIC | Age: 54
End: 2024-11-20
Payer: COMMERCIAL

## 2024-11-20 VITALS
DIASTOLIC BLOOD PRESSURE: 70 MMHG | SYSTOLIC BLOOD PRESSURE: 120 MMHG | BODY MASS INDEX: 40.64 KG/M2 | HEART RATE: 84 BPM | WEIGHT: 207 LBS | HEIGHT: 60 IN

## 2024-11-20 DIAGNOSIS — K21.9 GASTROESOPHAGEAL REFLUX DISEASE WITHOUT ESOPHAGITIS: ICD-10-CM

## 2024-11-20 DIAGNOSIS — G47.33 OSA (OBSTRUCTIVE SLEEP APNEA): ICD-10-CM

## 2024-11-20 DIAGNOSIS — G89.29 CHRONIC NONINTRACTABLE HEADACHE, UNSPECIFIED HEADACHE TYPE: Primary | ICD-10-CM

## 2024-11-20 DIAGNOSIS — R51.9 CHRONIC NONINTRACTABLE HEADACHE, UNSPECIFIED HEADACHE TYPE: Primary | ICD-10-CM

## 2024-11-20 DIAGNOSIS — Z23 IMMUNIZATION DUE: ICD-10-CM

## 2024-11-20 PROCEDURE — 99213 OFFICE O/P EST LOW 20 MIN: CPT | Performed by: STUDENT IN AN ORGANIZED HEALTH CARE EDUCATION/TRAINING PROGRAM

## 2024-11-20 PROCEDURE — 90471 IMMUNIZATION ADMIN: CPT | Performed by: STUDENT IN AN ORGANIZED HEALTH CARE EDUCATION/TRAINING PROGRAM

## 2024-11-20 PROCEDURE — 3008F BODY MASS INDEX DOCD: CPT | Performed by: STUDENT IN AN ORGANIZED HEALTH CARE EDUCATION/TRAINING PROGRAM

## 2024-11-20 PROCEDURE — 1036F TOBACCO NON-USER: CPT | Performed by: STUDENT IN AN ORGANIZED HEALTH CARE EDUCATION/TRAINING PROGRAM

## 2024-11-20 PROCEDURE — 90715 TDAP VACCINE 7 YRS/> IM: CPT | Performed by: STUDENT IN AN ORGANIZED HEALTH CARE EDUCATION/TRAINING PROGRAM

## 2024-11-20 RX ORDER — TOPIRAMATE 50 MG/1
50 TABLET, FILM COATED ORAL 2 TIMES DAILY
Qty: 180 TABLET | Refills: 1 | Status: SHIPPED | OUTPATIENT
Start: 2024-11-20

## 2024-11-20 RX ORDER — OMEPRAZOLE 20 MG/1
20 CAPSULE, DELAYED RELEASE ORAL
COMMUNITY

## 2024-11-20 ASSESSMENT — ENCOUNTER SYMPTOMS
SHORTNESS OF BREATH: 0
HEADACHES: 1
CHILLS: 0
FEVER: 0
NERVOUS/ANXIOUS: 0
COUGH: 0
PALPITATIONS: 0
DYSPHORIC MOOD: 0

## 2024-11-20 NOTE — PATIENT INSTRUCTIONS
Week 1: 1/2 tab every day  Week 2: 1 tab every day  Week 3: 1.5 tab every day  Week 4: 2 tab every day

## 2024-11-20 NOTE — PROGRESS NOTES
Subjective   Patient ID: Dimple Khanna is a 54 y.o. female who presents for 3 month check with labs     HPI  GERD - PPI no longer covered by insurance so purchasing otc, taking 20mg omeprazole every day which is controlling her symptoms    DIETER - struggled with CPAP at first, now doing well with it and feeling improved, feeling more rested, less fatigued    Headaches - unfortunately continues to have regular headaches, improved with Topamax in the past but was stopped due to kidney stone, states was drinking quite a bit of tea at that time and is now drinking less tea and much more water, she is interested in restarting the Topamax    Cervical Cancer Screening: s/p hyst due to AUB, cotest neg 5/20/2022 with obgyn  Breast Cancer Screening: due 2/2025  Osteoporosis Screening: plan to start at age 65  Colon Cancer Screening: due, she plans to schedule  Tobacco: remote rare use  Alcohol: rare  Recreational Drugs: denies  Immunizations: TDaP today, she will consider Shingrix    Review of Systems   Constitutional:  Negative for chills and fever.   Respiratory:  Negative for cough and shortness of breath.    Cardiovascular:  Negative for chest pain and palpitations.   Skin:  Negative for rash.   Neurological:  Positive for headaches.   Psychiatric/Behavioral:  Negative for dysphoric mood. The patient is not nervous/anxious.      Objective   /70   Pulse 84   Ht 1.524 m (5')   Wt 93.9 kg (207 lb)   BMI 40.43 kg/m²     Physical Exam  Constitutional:       Appearance: Normal appearance.   HENT:      Head: Normocephalic and atraumatic.   Eyes:      General: No scleral icterus.     Conjunctiva/sclera: Conjunctivae normal.   Cardiovascular:      Rate and Rhythm: Normal rate and regular rhythm.      Heart sounds: No murmur heard.  Pulmonary:      Effort: Pulmonary effort is normal. No respiratory distress.      Breath sounds: Normal breath sounds.   Musculoskeletal:         General: No swelling. Normal range of  motion.      Cervical back: Normal range of motion and neck supple.   Skin:     General: Skin is warm and dry.      Findings: No rash.   Neurological:      General: No focal deficit present.      Mental Status: She is alert.   Psychiatric:         Mood and Affect: Mood normal.         Behavior: Behavior normal.       Assessment/Plan   Problem List Items Addressed This Visit             ICD-10-CM    DIETER (obstructive sleep apnea) G47.33     Feeling improved with CPAP. Will continue.         Relevant Orders    Follow Up In Primary Care - Health Maintenance    Nonintractable headache - Primary R51.9     Using shared decision making, we decided to restart Topamax due to success with the medication in the past. Medication dosing and side effects reviewed. Encouraged her to continue to focus on water intake. Return precautions reviewed.          Relevant Medications    topiramate (Topamax) 50 mg tablet    Other Relevant Orders    Follow Up In Primary Care - Health Maintenance    Gastroesophageal reflux disease without esophagitis K21.9     Okay to continue PPI.          Other Visit Diagnoses         Codes    Immunization due     Z23    Relevant Orders    Tdap vaccine, age 7 years and older (Completed)    Follow Up In Primary Care - Health Maintenance        Follow up in 3mo for recheck, sooner if needed.

## 2024-11-21 NOTE — ASSESSMENT & PLAN NOTE
Using shared decision making, we decided to restart Topamax due to success with the medication in the past. Medication dosing and side effects reviewed. Encouraged her to continue to focus on water intake. Return precautions reviewed.

## 2025-02-12 DIAGNOSIS — F51.01 PRIMARY INSOMNIA: ICD-10-CM

## 2025-02-12 RX ORDER — TRAZODONE HYDROCHLORIDE 50 MG/1
50 TABLET ORAL NIGHTLY PRN
Qty: 90 TABLET | Refills: 1 | Status: SHIPPED | OUTPATIENT
Start: 2025-02-12 | End: 2026-02-12

## 2025-02-24 ENCOUNTER — APPOINTMENT (OUTPATIENT)
Dept: PRIMARY CARE | Facility: CLINIC | Age: 55
End: 2025-02-24
Payer: COMMERCIAL

## 2025-03-20 DIAGNOSIS — F51.01 PRIMARY INSOMNIA: ICD-10-CM

## 2025-04-02 ENCOUNTER — APPOINTMENT (OUTPATIENT)
Dept: PRIMARY CARE | Facility: CLINIC | Age: 55
End: 2025-04-02
Payer: COMMERCIAL

## 2025-04-03 ENCOUNTER — APPOINTMENT (OUTPATIENT)
Dept: PRIMARY CARE | Facility: CLINIC | Age: 55
End: 2025-04-03
Payer: COMMERCIAL

## 2025-04-03 VITALS
HEART RATE: 80 BPM | DIASTOLIC BLOOD PRESSURE: 76 MMHG | SYSTOLIC BLOOD PRESSURE: 114 MMHG | BODY MASS INDEX: 38.09 KG/M2 | WEIGHT: 194 LBS | HEIGHT: 60 IN

## 2025-04-03 DIAGNOSIS — R73.03 PREDIABETES: ICD-10-CM

## 2025-04-03 DIAGNOSIS — E04.1 THYROID NODULE: ICD-10-CM

## 2025-04-03 DIAGNOSIS — Z12.11 ENCOUNTER FOR SCREENING FOR MALIGNANT NEOPLASM OF COLON: ICD-10-CM

## 2025-04-03 DIAGNOSIS — G89.29 CHRONIC NONINTRACTABLE HEADACHE, UNSPECIFIED HEADACHE TYPE: ICD-10-CM

## 2025-04-03 DIAGNOSIS — R51.9 CHRONIC NONINTRACTABLE HEADACHE, UNSPECIFIED HEADACHE TYPE: ICD-10-CM

## 2025-04-03 DIAGNOSIS — G47.33 OSA (OBSTRUCTIVE SLEEP APNEA): ICD-10-CM

## 2025-04-03 DIAGNOSIS — E78.5 DYSLIPIDEMIA: ICD-10-CM

## 2025-04-03 DIAGNOSIS — K21.9 GASTROESOPHAGEAL REFLUX DISEASE WITHOUT ESOPHAGITIS: ICD-10-CM

## 2025-04-03 DIAGNOSIS — E66.01 CLASS 2 SEVERE OBESITY DUE TO EXCESS CALORIES WITH SERIOUS COMORBIDITY AND BODY MASS INDEX (BMI) OF 37.0 TO 37.9 IN ADULT: ICD-10-CM

## 2025-04-03 DIAGNOSIS — Z00.00 ROUTINE GENERAL MEDICAL EXAMINATION AT A HEALTH CARE FACILITY: Primary | ICD-10-CM

## 2025-04-03 DIAGNOSIS — Z12.31 ENCOUNTER FOR SCREENING MAMMOGRAM FOR BREAST CANCER: ICD-10-CM

## 2025-04-03 DIAGNOSIS — F51.01 PRIMARY INSOMNIA: ICD-10-CM

## 2025-04-03 DIAGNOSIS — E66.812 CLASS 2 SEVERE OBESITY DUE TO EXCESS CALORIES WITH SERIOUS COMORBIDITY AND BODY MASS INDEX (BMI) OF 37.0 TO 37.9 IN ADULT: ICD-10-CM

## 2025-04-03 PROCEDURE — 3008F BODY MASS INDEX DOCD: CPT | Performed by: STUDENT IN AN ORGANIZED HEALTH CARE EDUCATION/TRAINING PROGRAM

## 2025-04-03 PROCEDURE — 1036F TOBACCO NON-USER: CPT | Performed by: STUDENT IN AN ORGANIZED HEALTH CARE EDUCATION/TRAINING PROGRAM

## 2025-04-03 PROCEDURE — 99396 PREV VISIT EST AGE 40-64: CPT | Performed by: STUDENT IN AN ORGANIZED HEALTH CARE EDUCATION/TRAINING PROGRAM

## 2025-04-03 RX ORDER — HYDROXYZINE PAMOATE 25 MG/1
25 CAPSULE ORAL NIGHTLY PRN
Qty: 90 CAPSULE | Refills: 1 | Status: SHIPPED | OUTPATIENT
Start: 2025-04-03 | End: 2026-04-03

## 2025-04-03 RX ORDER — HYDROXYZINE PAMOATE 25 MG/1
25 CAPSULE ORAL NIGHTLY PRN
Qty: 90 CAPSULE | Refills: 1 | OUTPATIENT
Start: 2025-04-03

## 2025-04-03 ASSESSMENT — PATIENT HEALTH QUESTIONNAIRE - PHQ9
2. FEELING DOWN, DEPRESSED OR HOPELESS: NOT AT ALL
1. LITTLE INTEREST OR PLEASURE IN DOING THINGS: NOT AT ALL
SUM OF ALL RESPONSES TO PHQ9 QUESTIONS 1 AND 2: 0

## 2025-04-03 ASSESSMENT — ENCOUNTER SYMPTOMS
CONSTIPATION: 0
DYSPHORIC MOOD: 0
COUGH: 0
PALPITATIONS: 0
ABDOMINAL PAIN: 0
NERVOUS/ANXIOUS: 0
FEVER: 0
NAUSEA: 0
DIARRHEA: 0
DYSURIA: 0
VOMITING: 0
CHILLS: 0
SHORTNESS OF BREATH: 0

## 2025-04-03 NOTE — PROGRESS NOTES
Subjective   Patient ID: Dimple Khanna is a 55 y.o. female who presents for 3 month check     HPI  Headaches/BMI - Topamax restarted at last appt, she is up to 100mg every day and feeling improved, tolerating the medication without adverse effects, she notes that she does sometimes has headaches attributed to stress (father recently had a stroke and she is primary caregiver) but tension headaches have essentially resolved, she is also down 13lb since restarting     Cervical Cancer Screening: s/p hyst due to AUB, cotest neg 5/20/2022 with obgyn  Breast Cancer Screening: due  Osteoporosis Screening: plan to start at age 67  Colon Cancer Screening: due  Tobacco: remote rare use  Alcohol: rare  Recreational Drugs: denies  Immunizations: she will consider Shingrix and [id number]    Review of Systems   Constitutional:  Negative for chills and fever.   Respiratory:  Negative for cough and shortness of breath.    Cardiovascular:  Negative for chest pain and palpitations.   Gastrointestinal:  Negative for abdominal pain, constipation, diarrhea, nausea and vomiting.   Genitourinary:  Negative for dysuria.   Skin:  Negative for rash.   Psychiatric/Behavioral:  Negative for dysphoric mood. The patient is not nervous/anxious.      Objective   /76   Pulse 80   Ht 1.524 m (5')   Wt 88 kg (194 lb)   BMI 37.89 kg/m²     Physical Exam  Vitals reviewed.   Constitutional:       General: She is not in acute distress.     Appearance: Normal appearance.   HENT:      Head: Normocephalic and atraumatic.   Eyes:      General: No scleral icterus.     Conjunctiva/sclera: Conjunctivae normal.   Cardiovascular:      Rate and Rhythm: Normal rate and regular rhythm.      Heart sounds: No murmur heard.  Pulmonary:      Effort: Pulmonary effort is normal. No respiratory distress.      Breath sounds: Normal breath sounds.   Abdominal:      General: Bowel sounds are normal. There is no distension.      Palpations: Abdomen is soft.       Tenderness: There is no abdominal tenderness. There is no guarding or rebound.   Musculoskeletal:         General: No swelling or deformity.      Cervical back: Normal range of motion and neck supple.   Skin:     General: Skin is warm and dry.      Findings: No rash.   Neurological:      General: No focal deficit present.      Mental Status: She is alert.   Psychiatric:         Mood and Affect: Mood normal.         Behavior: Behavior normal.       Assessment/Plan   Problem List Items Addressed This Visit             ICD-10-CM    Thyroid nodule E04.1     - Due for surveillance ultrasound         Relevant Orders    US thyroid    TSH with reflex to Free T4 if abnormal    Follow Up In Primary Care - Established    Routine general medical examination at a health care facility - Primary Z00.00    Primary insomnia F51.01     - Managed on trazodone 50mg and hydroxyzine 25mg at bedtime  - Well-controlled         Relevant Medications    hydrOXYzine pamoate (VistariL) 25 mg capsule    Other Relevant Orders    Follow Up In Primary Care - Established    Prediabetes R73.03     - Will update labs and will follow up with results         Relevant Orders    CBC and Auto Differential    Comprehensive Metabolic Panel    Hemoglobin A1C    Follow Up In Primary Care - Established    DIETER (obstructive sleep apnea) G47.33     - Well-controlled with CPAP         Relevant Orders    Follow Up In Primary Care - Established    Nonintractable headache R51.9     - Managed on Topamax 100mg every day  - Well-controlled         Relevant Orders    Follow Up In Primary Care - Established    Gastroesophageal reflux disease without esophagitis K21.9     - Okay to continue PPI         Dyslipidemia E78.5     - Will update labs and will follow up with results         Relevant Orders    Lipid Panel    Follow Up In Primary Care - Established    Class 2 severe obesity due to excess calories with serious comorbidity and body mass index (BMI) of 37.0 to 37.9 in  adult E66.812, E66.01, Z68.37     - Lifestyle modifications reviewed  - Doing well with Topamax  - Down 13lb since last appt          Other Visit Diagnoses         Codes    Encounter for screening mammogram for breast cancer     Z12.31    Relevant Orders    BI mammo bilateral screening tomosynthesis    Follow Up In Primary Care - Established    Encounter for screening for malignant neoplasm of colon     Z12.11    Relevant Orders    Referral to Gastroenterology    Follow Up In Primary Care - Established        Follow up in 6mo for recheck, sooner if needed.

## 2025-04-04 DIAGNOSIS — K21.9 GASTROESOPHAGEAL REFLUX DISEASE WITHOUT ESOPHAGITIS: ICD-10-CM

## 2025-04-04 DIAGNOSIS — Z12.11 COLON CANCER SCREENING: ICD-10-CM

## 2025-04-04 LAB
ALBUMIN SERPL-MCNC: 4.5 G/DL (ref 3.6–5.1)
ALBUMIN/GLOB SERPL: 2 (CALC) (ref 1–2.5)
ALP SERPL-CCNC: 120 U/L (ref 37–153)
ALT SERPL-CCNC: 16 U/L (ref 6–29)
AST SERPL-CCNC: 17 U/L (ref 10–35)
BASOPHILS # BLD AUTO: 60 CELLS/UL (ref 0–200)
BASOPHILS NFR BLD AUTO: 0.8 %
BILIRUB SERPL-MCNC: 0.5 MG/DL (ref 0.2–1.2)
BUN SERPL-MCNC: 16 MG/DL (ref 7–25)
BUN/CREAT SERPL: NORMAL (CALC) (ref 6–22)
CALCIUM SERPL-MCNC: 9.5 MG/DL (ref 8.6–10.4)
CHLORIDE SERPL-SCNC: 109 MMOL/L (ref 98–110)
CHOLEST SERPL-MCNC: 186 MG/DL
CHOLEST/HDLC SERPL: 3.6 (CALC)
CO2 SERPL-SCNC: 24 MMOL/L (ref 20–32)
CREAT SERPL-MCNC: 0.64 MG/DL (ref 0.5–1.03)
EGFRCR SERPLBLD CKD-EPI 2021: 106 ML/MIN/1.73M2
EOSINOPHIL # BLD AUTO: 368 CELLS/UL (ref 15–500)
EOSINOPHIL NFR BLD AUTO: 4.9 %
ERYTHROCYTE [DISTWIDTH] IN BLOOD BY AUTOMATED COUNT: 13.5 % (ref 11–15)
GLOBULIN SER CALC-MCNC: 2.3 G/DL (CALC) (ref 1.9–3.7)
GLUCOSE SERPL-MCNC: 99 MG/DL (ref 65–99)
HBA1C MFR BLD: 5.9 % OF TOTAL HGB
HCT VFR BLD AUTO: 44.1 % (ref 35–45)
HDLC SERPL-MCNC: 52 MG/DL
HGB BLD-MCNC: 14.6 G/DL (ref 11.7–15.5)
LDLC SERPL CALC-MCNC: 105 MG/DL (CALC)
LYMPHOCYTES # BLD AUTO: 2280 CELLS/UL (ref 850–3900)
LYMPHOCYTES NFR BLD AUTO: 30.4 %
MCH RBC QN AUTO: 27.7 PG (ref 27–33)
MCHC RBC AUTO-ENTMCNC: 33.1 G/DL (ref 32–36)
MCV RBC AUTO: 83.5 FL (ref 80–100)
MONOCYTES # BLD AUTO: 638 CELLS/UL (ref 200–950)
MONOCYTES NFR BLD AUTO: 8.5 %
NEUTROPHILS # BLD AUTO: 4155 CELLS/UL (ref 1500–7800)
NEUTROPHILS NFR BLD AUTO: 55.4 %
NONHDLC SERPL-MCNC: 134 MG/DL (CALC)
PLATELET # BLD AUTO: 293 THOUSAND/UL (ref 140–400)
PMV BLD REES-ECKER: 10 FL (ref 7.5–12.5)
POTASSIUM SERPL-SCNC: 4.5 MMOL/L (ref 3.5–5.3)
PROT SERPL-MCNC: 6.8 G/DL (ref 6.1–8.1)
RBC # BLD AUTO: 5.28 MILLION/UL (ref 3.8–5.1)
SODIUM SERPL-SCNC: 143 MMOL/L (ref 135–146)
TRIGL SERPL-MCNC: 176 MG/DL
TSH SERPL-ACNC: 1.2 MIU/L
WBC # BLD AUTO: 7.5 THOUSAND/UL (ref 3.8–10.8)

## 2025-04-04 RX ORDER — ONDANSETRON 4 MG/1
4 TABLET, ORALLY DISINTEGRATING ORAL EVERY 8 HOURS PRN
Qty: 20 TABLET | Refills: 3 | Status: SHIPPED | OUTPATIENT
Start: 2025-04-04

## 2025-04-14 ENCOUNTER — HOSPITAL ENCOUNTER (OUTPATIENT)
Dept: RADIOLOGY | Facility: CLINIC | Age: 55
Discharge: HOME | End: 2025-04-14
Payer: COMMERCIAL

## 2025-04-14 VITALS — WEIGHT: 194 LBS | HEIGHT: 60 IN | BODY MASS INDEX: 38.09 KG/M2

## 2025-04-14 DIAGNOSIS — Z12.31 ENCOUNTER FOR SCREENING MAMMOGRAM FOR BREAST CANCER: ICD-10-CM

## 2025-04-14 DIAGNOSIS — E04.1 THYROID NODULE: ICD-10-CM

## 2025-04-14 PROCEDURE — 77067 SCR MAMMO BI INCL CAD: CPT

## 2025-04-14 PROCEDURE — 76536 US EXAM OF HEAD AND NECK: CPT

## 2025-04-14 PROCEDURE — 77067 SCR MAMMO BI INCL CAD: CPT | Performed by: RADIOLOGY

## 2025-04-14 PROCEDURE — 77063 BREAST TOMOSYNTHESIS BI: CPT | Performed by: RADIOLOGY

## 2025-04-14 PROCEDURE — 76536 US EXAM OF HEAD AND NECK: CPT | Performed by: RADIOLOGY

## 2025-04-17 ENCOUNTER — TELEPHONE (OUTPATIENT)
Dept: PRIMARY CARE | Facility: CLINIC | Age: 55
End: 2025-04-17
Payer: COMMERCIAL

## 2025-06-09 ENCOUNTER — ANESTHESIA (OUTPATIENT)
Dept: OPERATING ROOM | Facility: HOSPITAL | Age: 55
End: 2025-06-09
Payer: COMMERCIAL

## 2025-06-09 ENCOUNTER — ANESTHESIA EVENT (OUTPATIENT)
Dept: OPERATING ROOM | Facility: HOSPITAL | Age: 55
End: 2025-06-09
Payer: COMMERCIAL

## 2025-06-09 ENCOUNTER — HOSPITAL ENCOUNTER (OUTPATIENT)
Dept: OPERATING ROOM | Facility: HOSPITAL | Age: 55
Setting detail: OUTPATIENT SURGERY
Discharge: HOME | End: 2025-06-09
Payer: COMMERCIAL

## 2025-06-09 VITALS
OXYGEN SATURATION: 93 % | HEIGHT: 60 IN | SYSTOLIC BLOOD PRESSURE: 100 MMHG | RESPIRATION RATE: 16 BRPM | WEIGHT: 186.95 LBS | TEMPERATURE: 97.6 F | DIASTOLIC BLOOD PRESSURE: 68 MMHG | HEART RATE: 80 BPM | BODY MASS INDEX: 36.7 KG/M2

## 2025-06-09 DIAGNOSIS — Z12.11 COLON CANCER SCREENING: ICD-10-CM

## 2025-06-09 PROCEDURE — 2500000004 HC RX 250 GENERAL PHARMACY W/ HCPCS (ALT 636 FOR OP/ED): Mod: JZ | Performed by: INTERNAL MEDICINE

## 2025-06-09 PROCEDURE — 3600000007 HC OR TIME - EACH INCREMENTAL 1 MINUTE - PROCEDURE LEVEL TWO: Performed by: INTERNAL MEDICINE

## 2025-06-09 PROCEDURE — 7100000009 HC PHASE TWO TIME - INITIAL BASE CHARGE: Performed by: INTERNAL MEDICINE

## 2025-06-09 PROCEDURE — 2500000004 HC RX 250 GENERAL PHARMACY W/ HCPCS (ALT 636 FOR OP/ED): Performed by: ANESTHESIOLOGY

## 2025-06-09 PROCEDURE — 45378 DIAGNOSTIC COLONOSCOPY: CPT | Performed by: INTERNAL MEDICINE

## 2025-06-09 PROCEDURE — 7100000010 HC PHASE TWO TIME - EACH INCREMENTAL 1 MINUTE: Performed by: INTERNAL MEDICINE

## 2025-06-09 PROCEDURE — 3600000002 HC OR TIME - INITIAL BASE CHARGE - PROCEDURE LEVEL TWO: Performed by: INTERNAL MEDICINE

## 2025-06-09 PROCEDURE — 3700000001 HC GENERAL ANESTHESIA TIME - INITIAL BASE CHARGE: Performed by: INTERNAL MEDICINE

## 2025-06-09 PROCEDURE — 3700000002 HC GENERAL ANESTHESIA TIME - EACH INCREMENTAL 1 MINUTE: Performed by: INTERNAL MEDICINE

## 2025-06-09 RX ORDER — SODIUM CHLORIDE, SODIUM LACTATE, POTASSIUM CHLORIDE, CALCIUM CHLORIDE 600; 310; 30; 20 MG/100ML; MG/100ML; MG/100ML; MG/100ML
100 INJECTION, SOLUTION INTRAVENOUS CONTINUOUS
Status: DISCONTINUED | OUTPATIENT
Start: 2025-06-09 | End: 2025-06-10 | Stop reason: HOSPADM

## 2025-06-09 RX ORDER — MIDAZOLAM HYDROCHLORIDE 1 MG/ML
INJECTION INTRAMUSCULAR; INTRAVENOUS AS NEEDED
Status: DISCONTINUED | OUTPATIENT
Start: 2025-06-09 | End: 2025-06-09

## 2025-06-09 RX ORDER — PROPOFOL 10 MG/ML
INJECTION, EMULSION INTRAVENOUS AS NEEDED
Status: DISCONTINUED | OUTPATIENT
Start: 2025-06-09 | End: 2025-06-09

## 2025-06-09 RX ORDER — MIDAZOLAM HYDROCHLORIDE 1 MG/ML
2 INJECTION INTRAMUSCULAR; INTRAVENOUS ONCE AS NEEDED
Status: COMPLETED | OUTPATIENT
Start: 2025-06-09 | End: 2025-06-09

## 2025-06-09 RX ORDER — LIDOCAINE HYDROCHLORIDE 10 MG/ML
INJECTION, SOLUTION EPIDURAL; INFILTRATION; INTRACAUDAL; PERINEURAL AS NEEDED
Status: DISCONTINUED | OUTPATIENT
Start: 2025-06-09 | End: 2025-06-09

## 2025-06-09 RX ORDER — FENTANYL CITRATE 50 UG/ML
INJECTION, SOLUTION INTRAMUSCULAR; INTRAVENOUS AS NEEDED
Status: DISCONTINUED | OUTPATIENT
Start: 2025-06-09 | End: 2025-06-09

## 2025-06-09 RX ADMIN — FENTANYL CITRATE 50 MCG: 50 INJECTION, SOLUTION INTRAMUSCULAR; INTRAVENOUS at 10:27

## 2025-06-09 RX ADMIN — MIDAZOLAM HYDROCHLORIDE 2 MG: 1 INJECTION, SOLUTION INTRAMUSCULAR; INTRAVENOUS at 10:16

## 2025-06-09 RX ADMIN — GLUCAGON 1 MG: KIT at 10:29

## 2025-06-09 RX ADMIN — PROPOFOL 30 MG: 10 INJECTION, EMULSION INTRAVENOUS at 10:44

## 2025-06-09 RX ADMIN — LIDOCAINE HYDROCHLORIDE 3 ML: 10 INJECTION, SOLUTION EPIDURAL; INFILTRATION; INTRACAUDAL; PERINEURAL at 10:27

## 2025-06-09 RX ADMIN — MIDAZOLAM HYDROCHLORIDE 1 MG: 1 INJECTION, SOLUTION INTRAMUSCULAR; INTRAVENOUS at 10:27

## 2025-06-09 RX ADMIN — PROPOFOL 30 MG: 10 INJECTION, EMULSION INTRAVENOUS at 10:35

## 2025-06-09 RX ADMIN — PROPOFOL 30 MG: 10 INJECTION, EMULSION INTRAVENOUS at 10:27

## 2025-06-09 SDOH — HEALTH STABILITY: MENTAL HEALTH: CURRENT SMOKER: 0

## 2025-06-09 ASSESSMENT — PAIN SCALES - GENERAL
PAINLEVEL_OUTOF10: 2
PAINLEVEL_OUTOF10: 0 - NO PAIN
PAIN_LEVEL: 3

## 2025-06-09 ASSESSMENT — COLUMBIA-SUICIDE SEVERITY RATING SCALE - C-SSRS
1. IN THE PAST MONTH, HAVE YOU WISHED YOU WERE DEAD OR WISHED YOU COULD GO TO SLEEP AND NOT WAKE UP?: NO
2. HAVE YOU ACTUALLY HAD ANY THOUGHTS OF KILLING YOURSELF?: NO
6. HAVE YOU EVER DONE ANYTHING, STARTED TO DO ANYTHING, OR PREPARED TO DO ANYTHING TO END YOUR LIFE?: NO

## 2025-06-09 ASSESSMENT — PAIN - FUNCTIONAL ASSESSMENT
PAIN_FUNCTIONAL_ASSESSMENT: 0-10
PAIN_FUNCTIONAL_ASSESSMENT: 0-10

## 2025-06-09 ASSESSMENT — PAIN DESCRIPTION - DESCRIPTORS: DESCRIPTORS: CRAMPING

## 2025-06-09 NOTE — H&P
History Of Present Illness  Dimple Khanna is a 55 y.o. female presenting with colon cancer screening.     Past Medical History  Medical History[1]  Surgical History  Surgical History[2]  Social History  She reports that she has quit smoking. Her smoking use included cigarettes. She has never used smokeless tobacco. She reports that she does not currently use alcohol. She reports that she does not currently use drugs.    Family History  Family History[3]     Allergies  Allergies[4]  Review of Systems     Physical Exam  Constitutional:       General: She is awake.      Appearance: Normal appearance.   HENT:      Head: Normocephalic and atraumatic.      Nose: Nose normal.      Mouth/Throat:      Mouth: Mucous membranes are moist.   Eyes:      Pupils: Pupils are equal, round, and reactive to light.   Neck:      Thyroid: No thyroid mass.      Trachea: Phonation normal.   Cardiovascular:      Rate and Rhythm: Normal rate and regular rhythm.      Heart sounds: Normal heart sounds.   Pulmonary:      Effort: Pulmonary effort is normal. No respiratory distress.      Breath sounds: Normal air entry. No decreased breath sounds, wheezing, rhonchi or rales.   Abdominal:      General: Bowel sounds are normal. There is no distension.      Palpations: Abdomen is soft.      Tenderness: There is no abdominal tenderness.   Musculoskeletal:      Cervical back: Neck supple.      Right lower leg: No edema.      Left lower leg: No edema.   Skin:     General: Skin is warm.      Capillary Refill: Capillary refill takes less than 2 seconds.   Neurological:      General: No focal deficit present.      Mental Status: She is alert and oriented to person, place, and time. Mental status is at baseline.      Cranial Nerves: Cranial nerves 2-12 are intact.      Motor: Motor function is intact.   Psychiatric:         Attention and Perception: Attention and perception normal.         Mood and Affect: Mood normal.         Speech: Speech  "normal.         Behavior: Behavior normal.          Last Recorded Vitals  Blood pressure 141/86, pulse 96, temperature 36.9 °C (98.4 °F), temperature source Temporal, resp. rate 16, height 1.53 m (5' 0.24\"), weight 84.8 kg (186 lb 15.2 oz), SpO2 95%.    Assessment/Plan   Problem List Items Addressed This Visit    None  Visit Diagnoses         Colon cancer screening        Relevant Orders    Colonoscopy Screening; Average Risk Patient               Vinnie Gomez DO       [1]   Past Medical History:  Diagnosis Date    Allergic     Arthritis     BRCA1 negative     Depression     Headache     Hypertension 10/23    Insomnia     Opiate dependence (Multi)     Sleep apnea     Substance abuse     Varicella 1978    Visual impairment    [2]   Past Surgical History:  Procedure Laterality Date    APPENDECTOMY       SECTION, LOW TRANSVERSE      CHOLECYSTECTOMY      ENDOMETRIAL ABLATION      EYE SURGERY      HYSTERECTOMY      JOINT REPLACEMENT      KNEE ARTHROPLASTY      x2    LITHOTRIPSY      SHOULDER      TUBAL LIGATION      WISDOM TOOTH EXTRACTION     [3]   Family History  Problem Relation Name Age of Onset    Breast cancer Mother Angy     Arthritis Mother Angy     Hypertension Father Bill     Hyperlipidemia Father Bill     Diabetes Father Bill     Early natural death Maternal Grandfather Masood     Cancer Maternal Grandmother Suni     Colon cancer Maternal Grandmother Suni     Diabetes Paternal Grandfather Shay     Stroke Paternal Grandmother [place]     Blood clot Paternal Grandmother Rose     Hypertension Father's Brother Torey    [4] No Known Allergies    "

## 2025-06-09 NOTE — DISCHARGE INSTRUCTIONS
Patient Instructions after a Colonoscopy      The anesthetics, sedatives or narcotics which were given to you today will be acting in your body for the next 24 hours, so you might feel a little sleepy or groggy.  This feeling should slowly wear off. Carefully read and follow the instructions.     You received sedation today:  - Do not drive or operate any machinery or power tools of any kind.   - No alcoholic beverages today, not even beer or wine.  - Do not make any important decisions or sign any legal documents.  - No over the counter medications that contain alcohol or that may cause drowsiness.  - Do not make any important decisions or sign any legal documents.  - Make sure you have someone with you for first 24 hours.    While it is common to experience mild to moderate abdominal distention, gas, or belching after your procedure, if any of these symptoms occur following discharge from the GI Lab or within one week of having your procedure, call the [facility] to be advised whether a visit to your nearest Urgent Care or Emergency Department is indicated.  Take this paper with you if you go.     - If you develop an allergic reaction to the medications that were given during your procedure such as difficulty breathing, rash, hives, severe nausea, vomiting or lightheadedness.  - If you experience chest pain, shortness of breath, severe abdominal pain, fevers and chills.  -If you develop signs and symptoms of bleeding such as blood in your spit, if your stools turn black, tarry, or bloody  - If you have not urinated within 8 hours following your procedure.  - If your IV site becomes painful, red, inflamed, or looks infected.    If you received a biopsy/polypectomy/sphincterotomy the following instructions apply below:    __ Do not use Aspirin containing products, non-steroidal medications or anti-coagulants for one week following your procedure. (Examples of these types of medications are: Advil,  Arthrotec, Aleve, Coumadin, Ecotrin, Heparin, Ibuprofen, Indocin, Motrin, Naprosyn, Nuprin, Plavix, Vioxx, and Voltarin, or their generic forms.  This list is not all-inclusive.  Check with your physician or pharmacist before resuming medications.)   __ Eat a soft diet today.  Avoid foods that are poorly digested for the next 24 hours.  These foods would include: nuts, beans, lettuce, red meats, and fried foods. Start with liquids and advance your diet as tolerated, gradually work up to eating solids.   __ Do not have a Barium Study or Enema for one week.    Your physician recommends the additional following instructions:    -You have a contact number available for emergencies. The signs and symptoms of potential delayed complications were discussed with you. You may return to normal activities tomorrow.  -Resume your previous diet.  -Continue your present medications.   -We are waiting for your pathology results.  -Your physician has recommended a repeat colonoscopy (date to be determined after pending pathology results are reviewed) for surveillance based on pathology results.  -The findings and recommendations have been discussed with you.  -The findings and recommendations were discussed with your family.  - Please see Medication Reconciliation Form for new medication/medications prescribed.       If you experience any problems or have any questions following discharge from the GI Lab, please call:        Nurse Signature                                                                        Date___________________                                                                            Patient/Responsible Party Signature                                        Date___________________

## 2025-06-09 NOTE — ANESTHESIA PREPROCEDURE EVALUATION
Patient: Dimple Khanna    Procedure Information       Date/Time: 06/09/25 1120    Scheduled providers: Vinnie Gomez DO; Rashad Laguna MD; VIOLET Campbell; Micah Andersen, KARISSA; Kaylin Robles, RN    Procedure: COLONOSCOPY    Location: [facility] OR            Relevant Problems   Pulmonary   (+) DIETER (obstructive sleep apnea)      Neuro   (+) Opioid dependence in remission (Multi)      GI   (+) Gastroesophageal reflux disease without esophagitis      Endocrine   (+) Class 2 severe obesity due to excess calories with serious comorbidity and body mass index (BMI) of 37.0 to 37.9 in adult       Clinical information reviewed:   Tobacco  Allergies  Meds   Med Hx  Surg Hx  OB Status  Fam Hx  Soc   Hx        NPO Detail:  NPO/Void Status  Carbohydrate Drink Given Prior to Surgery? : N  Date of Last Liquid: 06/09/25  Time of Last Liquid: 0330  Date of Last Solid: 06/08/25  Time of Last Solid: 0900  Last Intake Type: Clear fluids  Time of Last Void: 0959         PHYSICAL EXAM    Anesthesia Plan    History of general anesthesia?: yes  History of complications of general anesthesia?: no    ASA 2     MAC     The patient is not a current smoker.    intravenous induction   Anesthetic plan and risks discussed with patient.

## 2025-06-09 NOTE — ANESTHESIA POSTPROCEDURE EVALUATION
Patient: Dimple Khanna    Procedure Summary       Date: 06/09/25 Room / Location: [facility] OR    Anesthesia Start: 1022 Anesthesia Stop: 1056    Procedure: COLONOSCOPY Diagnosis: Colon cancer screening    Scheduled Providers: Vinnie Gomez DO; Rashad Laguna MD; VIOLET Campbell; Micah Andersen RN; Kaylin Robles RN Responsible Provider: Rashad Laguna MD    Anesthesia Type: MAC ASA Status: 2            Anesthesia Type: MAC    Vitals Value Taken Time   BP  06/09/25 10:56   Temp  06/09/25 10:56   Pulse 80 06/09/25 10:56   Resp 12 06/09/25 10:56   SpO2 99 06/09/25 10:56       Anesthesia Post Evaluation    Patient location during evaluation: PACU  Patient participation: complete - patient participated  Level of consciousness: awake and alert  Pain score: 3  Pain management: adequate  Multimodal analgesia pain management approach  Airway patency: patent  Cardiovascular status: acceptable  Respiratory status: acceptable  Hydration status: acceptable  Postoperative Nausea and Vomiting: none        No notable events documented.

## 2025-06-11 DIAGNOSIS — F51.01 PRIMARY INSOMNIA: ICD-10-CM

## 2025-06-11 RX ORDER — TRAZODONE HYDROCHLORIDE 100 MG/1
100 TABLET ORAL NIGHTLY
COMMUNITY
End: 2025-06-11 | Stop reason: DRUGHIGH

## 2025-06-11 RX ORDER — TRAZODONE HYDROCHLORIDE 100 MG/1
100 TABLET ORAL NIGHTLY
Qty: 90 TABLET | Refills: 1 | Status: SHIPPED | OUTPATIENT
Start: 2025-06-11

## 2025-07-25 DIAGNOSIS — F51.01 PRIMARY INSOMNIA: ICD-10-CM

## 2025-07-25 RX ORDER — TRAZODONE HYDROCHLORIDE 50 MG/1
50 TABLET ORAL NIGHTLY PRN
Qty: 90 TABLET | Refills: 1 | Status: SHIPPED | OUTPATIENT
Start: 2025-07-25 | End: 2026-07-25

## 2025-07-28 DIAGNOSIS — R51.9 CHRONIC NONINTRACTABLE HEADACHE, UNSPECIFIED HEADACHE TYPE: ICD-10-CM

## 2025-07-28 DIAGNOSIS — G89.29 CHRONIC NONINTRACTABLE HEADACHE, UNSPECIFIED HEADACHE TYPE: ICD-10-CM

## 2025-07-28 RX ORDER — TOPIRAMATE 50 MG/1
50 TABLET, FILM COATED ORAL 2 TIMES DAILY
Qty: 180 TABLET | Refills: 1 | Status: SHIPPED | OUTPATIENT
Start: 2025-07-28

## 2025-10-06 ENCOUNTER — APPOINTMENT (OUTPATIENT)
Dept: PRIMARY CARE | Facility: CLINIC | Age: 55
End: 2025-10-06
Payer: COMMERCIAL